# Patient Record
Sex: FEMALE | Race: WHITE | NOT HISPANIC OR LATINO | Employment: FULL TIME | URBAN - METROPOLITAN AREA
[De-identification: names, ages, dates, MRNs, and addresses within clinical notes are randomized per-mention and may not be internally consistent; named-entity substitution may affect disease eponyms.]

---

## 2017-01-09 ENCOUNTER — TRANSFERRED RECORDS (OUTPATIENT)
Dept: HEALTH INFORMATION MANAGEMENT | Facility: CLINIC | Age: 47
End: 2017-01-09

## 2017-04-13 ENCOUNTER — TELEPHONE (OUTPATIENT)
Dept: PEDIATRICS | Facility: CLINIC | Age: 47
End: 2017-04-13

## 2017-04-13 PROBLEM — Z53.8 PAP SMEAR OF CERVIX NOT NEEDED: Status: ACTIVE | Noted: 2017-04-13

## 2017-04-13 PROBLEM — Z90.710 H/O HYSTERECTOMY FOR BENIGN DISEASE: Status: ACTIVE | Noted: 2017-04-13

## 2017-04-13 NOTE — TELEPHONE ENCOUNTER
Summary:    Patient is due/failing the following:   PAP    Action needed:   Patient needs office visit for PAP.    Type of outreach:    None, routed to provider for review.    Questions for provider review:    Per surgical history patient had hysterectomy however unclear if patient is due for routine PAPs. If patient no longer needs PAPs please put surgical code HYSTERECTOMY, PAP NO LONGER INDICATED [[id number]] to exclude patient from the measure.                                                                                                                                   Savita Ferguson       Chart routed to Provider .    Panel Management Review      Patient has the following on her problem list: None      Composite cancer screening  Chart review shows that this patient is due/due soon for the following Pap Smear

## 2017-04-28 ENCOUNTER — TRANSFERRED RECORDS (OUTPATIENT)
Dept: HEALTH INFORMATION MANAGEMENT | Facility: CLINIC | Age: 47
End: 2017-04-28

## 2019-05-21 ENCOUNTER — OFFICE VISIT (OUTPATIENT)
Dept: DERMATOLOGY | Facility: CLINIC | Age: 49
End: 2019-05-21
Payer: COMMERCIAL

## 2019-05-21 DIAGNOSIS — L81.4 SOLAR LENTIGO: Primary | ICD-10-CM

## 2019-05-21 DIAGNOSIS — D18.01 CHERRY ANGIOMA: ICD-10-CM

## 2019-05-21 DIAGNOSIS — L57.8 SUN-DAMAGED SKIN: ICD-10-CM

## 2019-05-21 DIAGNOSIS — L73.8 SENILE SEBACEOUS GLAND HYPERPLASIA: ICD-10-CM

## 2019-05-21 DIAGNOSIS — D22.9 MULTIPLE BENIGN NEVI: ICD-10-CM

## 2019-05-21 DIAGNOSIS — L91.8 SKIN TAG: ICD-10-CM

## 2019-05-21 DIAGNOSIS — L82.1 SEBORRHEIC KERATOSIS: ICD-10-CM

## 2019-05-21 PROCEDURE — 99203 OFFICE O/P NEW LOW 30 MIN: CPT | Performed by: DERMATOLOGY

## 2019-05-21 ASSESSMENT — PAIN SCALES - GENERAL: PAINLEVEL: NO PAIN (0)

## 2019-05-21 NOTE — PROGRESS NOTES
[facility] Dermatology Note      Dermatology Problem List:  1. None     Encounter Date: May 21, 2019    CC:  Chief Complaint   Patient presents with     Skin Check         History of Present Illness:  Ms. Cuca Tyler is a 49 year old female who presents in self referral for a skin check. Today, she has a spot of concern on her left cheek. It came in the last year. It is elevated. Denies any tender, nonhealing, bleeding skin lesions. She had a mole/skin tag removed on her abdomen in the past because it rubbed on her pant line. No personal history of skin cancer. History of SCC in grandfather and mother. Patient uses cream based CeraVe moisturizer nightly. No other concerns addressed today.        Past Medical History:   Patient Active Problem List   Diagnosis     Family history of thyroid cancer     Family history of thyroid disease     CARDIOVASCULAR SCREENING; LDL GOAL LESS THAN 160     Thyroid nodule     Status post biopsy of thyroid gland: Atypia of undetermined significance     Irritable bowel syndrome with diarrhea     DDD (degenerative disc disease), lumbar     Spondylolisthesis, lumbar region     Pap smear of cervix not needed     H/O hysterectomy for benign disease     Past Medical History:   Diagnosis Date     Cancer (H)     Thyroid, my aunt & daughter     DDD (degenerative disc disease), lumbar 10/31/2016     Diabetes (H)     Mom     H/O hysterectomy for benign disease      Pap smear of cervix not needed      PONV (postoperative nausea and vomiting)      Thyroid nodule 5/26/2015     Past Surgical History:   Procedure Laterality Date     BIOPSY  5/15 & 2/16    Both on thyroid     FUSION LUMBAR ANTERIOR ONE LEVEL N/A 11/9/2016    Procedure: FUSION LUMBAR ANTERIOR ONE LEVEL;  Surgeon: Kit Barrera MD;  Location: [facility] OR     GYN SURGERY  8/2008    Hysterectomy     HEAD & NECK SURGERY  3/11/16    Partial thyroid lobectomy, left lobe     HYSTERECTOMY  8/2008    heavy  menses, fibroids     HYSTERECTOMY, PAP NO LONGER INDICATED  2008     OPTICAL TRACKING SYSTEM FUSION SPINE POSTERIOR LUMBAR PERCUTANEOUS ONE LEVEL N/A 11/9/2016    Procedure: OPTICAL TRACKING SYSTEM FUSION SPINE POSTERIOR LUMBAR PERCUTANEOUS ONE LEVEL;  Surgeon: Kit Barrera MD;  Location: [facility] OR       Social History:  Patient reports that she quit smoking about 22 years ago. Her smoking use included cigarettes. She has a 10.00 pack-year smoking history. She has never used smokeless tobacco. She reports that she drinks alcohol. She reports that she does not use drugs. Works as a .     Family History:  Hx of SCC in grandfather and mother.      Medications:  Current Outpatient Medications   Medication Sig Dispense Refill     amitriptyline (ELAVIL) 10 MG tablet Take 10 mg by mouth At Bedtime  1     cyclobenzaprine (FLEXERIL) 10 MG tablet Take 1 tablet (10 mg) by mouth 3 times daily as needed for muscle spasms (Patient not taking: Reported on 5/21/2019) 42 tablet 0     Fexofenadine HCl (MUCINEX ALLERGY PO)        fluticasone (FLONASE) 50 MCG/ACT nasal spray Spray 1-2 sprays into both nostrils daily (Patient not taking: Reported on 5/21/2019) 1 Bottle 0     oxyCODONE (OXYCONTIN) 10 MG 12 hr tablet Take 1 tablet (10 mg) by mouth every evening (Patient not taking: Reported on 5/21/2019) 5 tablet 0     oxyCODONE (ROXICODONE) 15 MG immediate release tablet Take 1 tablet (15 mg) by mouth every 3 hours as needed for moderate to severe pain (Patient not taking: Reported on 5/21/2019) 90 tablet 0     senna-docusate (SENOKOT-S;PERICOLACE) 8.6-50 MG per tablet Take 1-2 tablets by mouth 2 times daily (Patient not taking: Reported on 5/21/2019) 100 tablet 0       No Known Allergies    Review of Systems:  -Constitutional: Patient is otherwise feeling well, in usual state of health.   -Skin: As above in HPI. No additional skin concerns.    Physical exam:  Vitals: There were no vitals taken for this  visit.  GEN: This is a well developed, well-nourished female in no acute distress, in a pleasant mood.    SKIN: Total skin excluding the undergarment areas was performed. The exam included the head/face, neck, both arms, chest, back, abdomen, both legs, digits and/or nails and buttocks.   - Serrato Type II  - Minimal brown macules on sun exposed areas, mostly on the shoulders.  - skin tags in the bilateral underarms  - There are yellow oily papules with central umbilication located on the right forehead.  -There are dome shaped bright red papules on the trunk.   - Multiple regular brown pigmented macules and papules are identified on the face, trunk (area of pt concern).   - There are waxy stuck on tan to brown papules on the trunk.  - No other lesions of concern on areas examined.     Impression/Plan:  1. Sun damaged skin with solar lentigines    Recommend sunscreens SPF #30 or greater, protective clothing and avoidance of tanning beds.    2. Benign findings including: seborrheic keratoses, cherry angioma, skin tag    No further intervention required. Patient to report changes.     Patient reassured of the benign nature of these lesions.    3. Multiple clinically benign nevi    No further intervention required. Patient to report changes.     Patient reassured of the benign nature of these lesions.    Follow-up 2-3 years for skin exam, sooner for lesions of concern.     Staff Involved:  Scribe/Staff    Scribe Disclosure  I, Elida Parham, am serving as a scribe to document services personally performed by Dr. Kailyn Dillon MD, based on data collection and the provider's statements to me.     Provider Disclosure:   The documentation recorded by the scribe accurately reflects the services I personally performed and the decisions made by me.    Kailyn Dillon MD    Department of Dermatology  [facility]: Phone: [phone],  Fax:[phone]  [facility]: Phone: [phone], Fax: [phone]

## 2019-05-21 NOTE — NURSING NOTE
@Cuca Tyler's goals for this visit include:   Chief Complaint   Patient presents with     Skin Check       She requests these members of her care team be copied on today's visit information: NO    PCP: Tara Sanchez    Referring Provider:  No referring provider defined for this encounter.    There were no vitals taken for this visit.    Do you need any medication refills at today's visit? NO    Brittney Sawant CMA

## 2019-05-21 NOTE — LETTER
5/21/2019         RE: Cuca Tyler  [address] [place] [address]        Dear Colleague,    Thank you for referring your patient, Cuca Tyler, to the [facility]. Please see a copy of my visit note below.    [facility] Dermatology Note      Dermatology Problem List:  1. None     Encounter Date: May 21, 2019    CC:  Chief Complaint   Patient presents with     Skin Check         History of Present Illness:  Ms. Cuca Tyler is a 49 year old female who presents in self referral for a skin check. Today, she has a spot of concern on her left cheek. It came in the last year. It is elevated. Denies any tender, nonhealing, bleeding skin lesions. She had a mole/skin tag removed on her abdomen in the past because it rubbed on her pant line. No personal history of skin cancer. History of SCC in grandfather and mother. Patient uses cream based CeraVe moisturizer nightly. No other concerns addressed today.        Past Medical History:   Patient Active Problem List   Diagnosis     Family history of thyroid cancer     Family history of thyroid disease     CARDIOVASCULAR SCREENING; LDL GOAL LESS THAN 160     Thyroid nodule     Status post biopsy of thyroid gland: Atypia of undetermined significance     Irritable bowel syndrome with diarrhea     DDD (degenerative disc disease), lumbar     Spondylolisthesis, lumbar region     Pap smear of cervix not needed     H/O hysterectomy for benign disease     Past Medical History:   Diagnosis Date     Cancer (H)     Thyroid, my aunt & daughter     DDD (degenerative disc disease), lumbar 10/31/2016     Diabetes (H)     Mom     H/O hysterectomy for benign disease      Pap smear of cervix not needed      PONV (postoperative nausea and vomiting)      Thyroid nodule 5/26/2015     Past Surgical History:   Procedure Laterality Date     BIOPSY  5/15 & 2/16    Both on thyroid     FUSION LUMBAR ANTERIOR ONE LEVEL N/A 11/9/2016     Procedure: FUSION LUMBAR ANTERIOR ONE LEVEL;  Surgeon: Kit Barrera MD;  [facility]     GYN SURGERY  8/2008    Hysterectomy     HEAD & NECK SURGERY  3/11/16    Partial thyroid lobectomy, left lobe     HYSTERECTOMY  8/2008    heavy menses, fibroids     HYSTERECTOMY, PAP NO LONGER INDICATED  2008     OPTICAL TRACKING SYSTEM FUSION SPINE POSTERIOR LUMBAR PERCUTANEOUS ONE LEVEL N/A 11/9/2016    Procedure: OPTICAL TRACKING SYSTEM FUSION SPINE POSTERIOR LUMBAR PERCUTANEOUS ONE LEVEL;  Surgeon: Kit Barrera MD;  [facility]       Social History:  Patient reports that she quit smoking about 22 years ago. Her smoking use included cigarettes. She has a 10.00 pack-year smoking history. She has never used smokeless tobacco. She reports that she drinks alcohol. She reports that she does not use drugs. Works as a .     Family History:  Hx of SCC in grandfather and mother.      Medications:  Current Outpatient Medications   Medication Sig Dispense Refill     amitriptyline (ELAVIL) 10 MG tablet Take 10 mg by mouth At Bedtime  1     cyclobenzaprine (FLEXERIL) 10 MG tablet Take 1 tablet (10 mg) by mouth 3 times daily as needed for muscle spasms (Patient not taking: Reported on 5/21/2019) 42 tablet 0     Fexofenadine HCl (MUCINEX ALLERGY PO)        fluticasone (FLONASE) 50 MCG/ACT nasal spray Spray 1-2 sprays into both nostrils daily (Patient not taking: Reported on 5/21/2019) 1 Bottle 0     oxyCODONE (OXYCONTIN) 10 MG 12 hr tablet Take 1 tablet (10 mg) by mouth every evening (Patient not taking: Reported on 5/21/2019) 5 tablet 0     oxyCODONE (ROXICODONE) 15 MG immediate release tablet Take 1 tablet (15 mg) by mouth every 3 hours as needed for moderate to severe pain (Patient not taking: Reported on 5/21/2019) 90 tablet 0     senna-docusate (SENOKOT-S;PERICOLACE) 8.6-50 MG per tablet Take 1-2 tablets by mouth 2 times daily (Patient not taking: Reported on 5/21/2019) 100 tablet  0       No Known Allergies    Review of Systems:  -Constitutional: Patient is otherwise feeling well, in usual state of health.   -Skin: As above in HPI. No additional skin concerns.    Physical exam:  Vitals: There were no vitals taken for this visit.  GEN: This is a well developed, well-nourished female in no acute distress, in a pleasant mood.    SKIN: Total skin excluding the undergarment areas was performed. The exam included the head/face, neck, both arms, chest, back, abdomen, both legs, digits and/or nails and buttocks.   - Serrato Type II  - Minimal brown macules on sun exposed areas, mostly on the shoulders.  - skin tags in the bilateral underarms  - There are yellow oily papules with central umbilication located on the right forehead.  -There are dome shaped bright red papules on the trunk.   - Multiple regular brown pigmented macules and papules are identified on the face, trunk (area of pt concern).   - There are waxy stuck on tan to brown papules on the trunk.  - No other lesions of concern on areas examined.     Impression/Plan:  1. Sun damaged skin with solar lentigines    Recommend sunscreens SPF #30 or greater, protective clothing and avoidance of tanning beds.    2. Benign findings including: seborrheic keratoses, cherry angioma, skin tag    No further intervention required. Patient to report changes.     Patient reassured of the benign nature of these lesions.    3. Multiple clinically benign nevi    No further intervention required. Patient to report changes.     Patient reassured of the benign nature of these lesions.    Follow-up 2-3 years for skin exam, sooner for lesions of concern.     Staff Involved:  Scribe/Staff    Scribe Disclosure  I, Elida Parham, am serving as a scribe to document services personally performed by Dr. Kailyn Dillon MD, based on data collection and the provider's statements to me.     Provider Disclosure:   The documentation recorded by the scribe accurately  reflects the services I personally performed and the decisions made by me.    Kailyn Dillon MD    Department of Dermatology  [facility]: Phone: [phone], Fax:[phone]  [facility]: Phone: [phone], Fax: [phone]              Again, thank you for allowing me to participate in the care of your patient.        Sincerely,        Kailyn Dillon MD

## 2019-07-05 ENCOUNTER — TELEPHONE (OUTPATIENT)
Dept: ENDOCRINOLOGY | Facility: CLINIC | Age: 49
End: 2019-07-05

## 2019-07-05 NOTE — TELEPHONE ENCOUNTER
PREVISIT INFORMATION                                                    Cuca Tyler scheduled for future visit at [facility] specialty clinics.    Patient is scheduled to see Dr. Tamica Da Silva on 7/15/19  Reason for visit: thyroid cancer  Referring provider - None  Has patient seen previous specialist? No  Medical Records:  Available in chart.  Patient was previously seen at a [facility] or [facility] facility.    REVIEW                                                      New patient packet mailed to patient: N/A  Medication reconciliation complete: Yes      No current outpatient medications on file.       Allergies: Patient has no known allergies.    PLAN/FOLLOW-UP NEEDED                                                      Previsit review complete.  Patient will see provider at future scheduled appointment.     Patient Reminders Given:  Please, make sure you bring an updated list of your medications.   If you are having a procedure, please, present 15 minutes early.  If you need to cancel or reschedule,please call [phone].    Nkechi Smith CMA  Adult Endocrinology  [facility]

## 2019-07-15 ENCOUNTER — OFFICE VISIT (OUTPATIENT)
Dept: ENDOCRINOLOGY | Facility: CLINIC | Age: 49
End: 2019-07-15
Payer: COMMERCIAL

## 2019-07-15 VITALS
WEIGHT: 171.3 LBS | SYSTOLIC BLOOD PRESSURE: 123 MMHG | OXYGEN SATURATION: 99 % | HEART RATE: 71 BPM | BODY MASS INDEX: 29.4 KG/M2 | DIASTOLIC BLOOD PRESSURE: 81 MMHG

## 2019-07-15 DIAGNOSIS — C73 PAPILLARY THYROID CARCINOMA (H): Primary | ICD-10-CM

## 2019-07-15 LAB
T4 FREE SERPL-MCNC: 0.79 NG/DL (ref 0.76–1.46)
TSH SERPL DL<=0.005 MIU/L-ACNC: 0.9 MU/L (ref 0.4–4)

## 2019-07-15 PROCEDURE — 99204 OFFICE O/P NEW MOD 45 MIN: CPT | Performed by: INTERNAL MEDICINE

## 2019-07-15 PROCEDURE — 36415 COLL VENOUS BLD VENIPUNCTURE: CPT | Performed by: INTERNAL MEDICINE

## 2019-07-15 PROCEDURE — 84443 ASSAY THYROID STIM HORMONE: CPT | Performed by: INTERNAL MEDICINE

## 2019-07-15 PROCEDURE — 84439 ASSAY OF FREE THYROXINE: CPT | Performed by: INTERNAL MEDICINE

## 2019-07-15 NOTE — LETTER
7/15/2019         RE: Cuca Tyler  [address] [place] [address]        Dear Colleague,    Thank you for referring your patient, Cuca Tyler, to the [facility]. Please see a copy of my visit note below.         Endocrinology Note         Cuca is a 49 year old female presents today for left papillary thyroid cancer s/p left lobectomy    HPI  Cuca is a 49 year old female presents today for left papillary thyroid cancer s/p left lobectomy    Cuca is here to follow up  left papillary thyroid cancer s/p left lobectomy since 2016. She was found to have  left papillary thyroid cancer based on ultrasound in 2015 that showed 5.5 cm left sided heterogeneous nodule and 0.9 cm hypoechoic nodule at the isthmus along the right side. FNA x2 were AUS. 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 She has not had follow up lab or ultrasound due to lack of insurance after the surgery.    Over the past few years, she has been feeling well. She denied difficulty swallowing, breathing or lump in the neck. She occasionally felt mild painful small lymphadenopathy that was not persist. She has had hot flash. She has hx of hysterectomy with ovary preserved. She has IBS with some altered bowel movement. Energy is good. Weight is relatively stable.     Past Medical History  Past Medical History:   Diagnosis Date     Cancer (H)     Thyroid, my aunt & daughter     DDD (degenerative disc disease), lumbar 10/31/2016     Diabetes (H)     Mom     H/O hysterectomy for benign disease      Pap smear of cervix not needed      PONV (postoperative nausea and vomiting)      Thyroid nodule 5/26/2015       Allergies  No Known Allergies  Medications  No current outpatient medications on file.     Family History  family history includes Diabetes in her mother; Other Cancer  in her cousin, daughter, daughter, maternal aunt, and other family members; Thyroid Disease in her cousin, daughter, daughter, daughter, maternal aunt, maternal grandmother, and mother.  Social History  Social History     Tobacco Use     Smoking status: Former Smoker     Packs/day: 1.00     Years: 10.00     Pack years: 10.00     Types: Cigarettes     Last attempt to quit: 5/15/1997     Years since quittin.1     Smokeless tobacco: Never Used   Substance Use Topics     Alcohol use: Yes     Alcohol/week: 0.0 oz     Comment: A lillte / occasional      Drug use: No       ROS  Constitutional: no weight change, good energy  Eyes: no vision change, diplopia or red eyes   Neck: no difficulty swallowing, no choking, no neck pain, no neck swelling  Cardiovascular: no chest pain, palpitations  Respiratory: no dyspnea, cough, shortness of breath or wheezing   GI: no nausea, vomiting, diarrhea or constipation, no abdominal pain   : no change in urine, no dysuria or hematuria  Musculoskeletal: no joint or muscle pain or swelling   Integumentary: no concerning lesions or moles   Neuro: no loss of strength or sensation, no numbness or tingling, no tremor, no dizziness, no headache   Endo: no polyuria or polydipsia, no temperature intolerance   Heme/Lymph: no concerning bumps, no bleeding problems   Allergy: no environmental allergies   Psych: no depression or anxiety, no sleep problems.    Physical Exam  /81 (BP Location: Left arm, Patient Position: Sitting, Cuff Size: Adult Regular)   Pulse 71   Wt 77.7 kg (171 lb 4.8 oz)   SpO2 99%   BMI 29.40 kg/m     Body mass index is 29.4 kg/m .  Constitutional: no distress, comfortable, pleasant   Eyes: anicteric, normal extra-ocular movements, no lid lag or retraction  Neck: no thyromegaly, no discrete nodule  Cardiovascular: regular rate and rhythm, normal S1 and S2, no murmurs  Respiratory: clear to auscultation, no wheezes or crackles, normal breath sounds    Gastrointestinal:  nontender, no hepatosplenomegaly, no masses   Musculoskeletal: no edema   Skin: no concerning lesions, no jaundice   Neurological: cranial nerves intact, normal strength and sensation, reflexes at patella, normal gait, no tremor on outstretched hands bilaterally  Psychological: appropriate mood   Lymphatic: no cervical  lymphadenopathy.      RESULTS  I have personally reviewed labs and images. I also reviewed labs with patient and discussed the result and plan of care.      US thyroid 5/22/2015  FINDINGS: The right lobe the thyroid measures 4.6 x 1.6 x 1.7 cm, the left lobe of the thyroid measures 6.0 x 2.4 x 3.2 cm, and the isthmus measures 0.7 cm.       Within the isthmus along the right side is a 0.6 x 0.5 x 0.9 cm hypoechoic nodule with internal vascularity. .  In the left lobe of the thyroid there is a 3.0 x 1.9 x 5.5 cm heterogeneous nodule with internal vascularity. .     IMPRESSION:  2 thyroid nodules which appear indeterminant. Thyroid ultrasound cannot differentiate malignancy from malignancy.     FNA 5/29/2015  CYTOLOGIC INTERPRETATION:     Thyroid, left nodule, ultrasound guided fine needle aspiration:   -Atypia of undetermined significance   Specimen Adequacy: Satisfactory for evaluation.     FNA 2/2/2016  CYTOLOGIC INTERPRETATION:     Thyroid, left nodule, ultrasound guided fine needle aspiration:   - Atypia of Undetermined Significance      Surgical pathology 3/11/2016  .   ASSESSMENT:    Cuca is a 49 year old female presents today for left papillary thyroid cancer s/p left lobectomy    1) left papillary thyroid cancer s/p left lobectomy: found in 2015 with large left thyroid nodule with 2 AUS in FNAs. Underwent left lobectomy in 2016. Pathology revealed micropapillary thyroid cancer (0.5 cm) without lymph node or extrathyroidal extension. Has not had followed since due to lack of insurance. No symptoms of neck compression, hypo or hyperthyroidism.   Will check lab and ultrasound  thyroid/lateral neck today.    PLAN:   - lab for TSH, FT4  - schedule ultrasound thyroid and neck    Tamica Da Silva MD  Division of Diabetes and Endocrinology  Department of Medicine  [phone]

## 2019-07-15 NOTE — NURSING NOTE
Cuca Tyler's goals for this visit include:   Chief Complaint   Patient presents with     Consult     Thyroid Disease     She requests these members of her care team be copied on today's visit information: Yes    PCP: Tara aSnchez    Referring Provider:  Tara Sanchez, APRN CNP  [id number]  [place] [address]    /81 (BP Location: Left arm, Patient Position: Sitting, Cuff Size: Adult Regular)   Pulse 71   Wt 77.7 kg (171 lb 4.8 oz)   SpO2 99%   BMI 29.40 kg/m      Do you need any medication refills at today's visit? No

## 2019-07-15 NOTE — PROGRESS NOTES
Endocrinology Note         Cuca is a 49 year old female presents today for left papillary thyroid cancer s/p left lobectomy    HPI  Cuca is a 49 year old female presents today for left papillary thyroid cancer s/p left lobectomy    Cuca is here to follow up  left papillary thyroid cancer s/p left lobectomy since 2016. She was found to have  left papillary thyroid cancer based on ultrasound in 2015 that showed 5.5 cm left sided heterogeneous nodule and 0.9 cm hypoechoic nodule at the isthmus along the right side. FNA x2 were AUS. 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 She has not had follow up lab or ultrasound due to lack of insurance after the surgery.    Over the past few years, she has been feeling well. She denied difficulty swallowing, breathing or lump in the neck. She occasionally felt mild painful small lymphadenopathy that was not persist. She has had hot flash. She has hx of hysterectomy with ovary preserved. She has IBS with some altered bowel movement. Energy is good. Weight is relatively stable.     Past Medical History  Past Medical History:   Diagnosis Date     Cancer (H)     Thyroid, my aunt & daughter     DDD (degenerative disc disease), lumbar 10/31/2016     Diabetes (H)     Mom     H/O hysterectomy for benign disease      Pap smear of cervix not needed      PONV (postoperative nausea and vomiting)      Thyroid nodule 5/26/2015       Allergies  No Known Allergies  Medications  No current outpatient medications on file.     Family History  family history includes Diabetes in her mother; Other Cancer in her cousin, daughter, daughter, maternal aunt, and other family members; Thyroid Disease in her cousin, daughter, daughter, daughter, maternal aunt, maternal grandmother, and mother.  Social History  Social History     Tobacco Use     Smoking  status: Former Smoker     Packs/day: 1.00     Years: 10.00     Pack years: 10.00     Types: Cigarettes     Last attempt to quit: 5/15/1997     Years since quittin.1     Smokeless tobacco: Never Used   Substance Use Topics     Alcohol use: Yes     Alcohol/week: 0.0 oz     Comment: A lillte / occasional      Drug use: No       ROS  Constitutional: no weight change, good energy  Eyes: no vision change, diplopia or red eyes   Neck: no difficulty swallowing, no choking, no neck pain, no neck swelling  Cardiovascular: no chest pain, palpitations  Respiratory: no dyspnea, cough, shortness of breath or wheezing   GI: no nausea, vomiting, diarrhea or constipation, no abdominal pain   : no change in urine, no dysuria or hematuria  Musculoskeletal: no joint or muscle pain or swelling   Integumentary: no concerning lesions or moles   Neuro: no loss of strength or sensation, no numbness or tingling, no tremor, no dizziness, no headache   Endo: no polyuria or polydipsia, no temperature intolerance   Heme/Lymph: no concerning bumps, no bleeding problems   Allergy: no environmental allergies   Psych: no depression or anxiety, no sleep problems.    Physical Exam  /81 (BP Location: Left arm, Patient Position: Sitting, Cuff Size: Adult Regular)   Pulse 71   Wt 77.7 kg (171 lb 4.8 oz)   SpO2 99%   BMI 29.40 kg/m    Body mass index is 29.4 kg/m .  Constitutional: no distress, comfortable, pleasant   Eyes: anicteric, normal extra-ocular movements, no lid lag or retraction  Neck: no thyromegaly, no discrete nodule  Cardiovascular: regular rate and rhythm, normal S1 and S2, no murmurs  Respiratory: clear to auscultation, no wheezes or crackles, normal breath sounds   Gastrointestinal:  nontender, no hepatosplenomegaly, no masses   Musculoskeletal: no edema   Skin: no concerning lesions, no jaundice   Neurological: cranial nerves intact, normal strength and sensation, reflexes at patella, normal gait, no tremor on  outstretched hands bilaterally  Psychological: appropriate mood   Lymphatic: no cervical  lymphadenopathy.      RESULTS  I have personally reviewed labs and images. I also reviewed labs with patient and discussed the result and plan of care.      US thyroid 5/22/2015  FINDINGS: The right lobe the thyroid measures 4.6 x 1.6 x 1.7 cm, the left lobe of the thyroid measures 6.0 x 2.4 x 3.2 cm, and the isthmus measures 0.7 cm.       Within the isthmus along the right side is a 0.6 x 0.5 x 0.9 cm hypoechoic nodule with internal vascularity. .  In the left lobe of the thyroid there is a 3.0 x 1.9 x 5.5 cm heterogeneous nodule with internal vascularity. .     IMPRESSION:  2 thyroid nodules which appear indeterminant. Thyroid ultrasound cannot differentiate malignancy from malignancy.     FNA 5/29/2015  CYTOLOGIC INTERPRETATION:     Thyroid, left nodule, ultrasound guided fine needle aspiration:   -Atypia of undetermined significance   Specimen Adequacy: Satisfactory for evaluation.     FNA 2/2/2016  CYTOLOGIC INTERPRETATION:     Thyroid, left nodule, ultrasound guided fine needle aspiration:   - Atypia of Undetermined Significance      Surgical pathology 3/11/2016  .   ASSESSMENT:    Cuca is a 49 year old female presents today for left papillary thyroid cancer s/p left lobectomy    1) left papillary thyroid cancer s/p left lobectomy: found in 2015 with large left thyroid nodule with 2 AUS in FNAs. Underwent left lobectomy in 2016. Pathology revealed micropapillary thyroid cancer (0.5 cm) without lymph node or extrathyroidal extension. Has not had followed since due to lack of insurance. No symptoms of neck compression, hypo or hyperthyroidism.   Will check lab and ultrasound thyroid/lateral neck today.    PLAN:   - lab for TSH, FT4  - schedule ultrasound thyroid and neck    Tamica Da Silva MD  Division of Diabetes and Endocrinology  Department of Medicine  [phone]

## 2019-07-29 ENCOUNTER — ANCILLARY PROCEDURE (OUTPATIENT)
Dept: ULTRASOUND IMAGING | Facility: CLINIC | Age: 49
End: 2019-07-29
Attending: INTERNAL MEDICINE
Payer: COMMERCIAL

## 2019-07-29 DIAGNOSIS — C73 PAPILLARY THYROID CARCINOMA (H): ICD-10-CM

## 2019-07-29 PROCEDURE — 76536 US EXAM OF HEAD AND NECK: CPT | Performed by: RADIOLOGY

## 2019-08-07 ENCOUNTER — TELEPHONE (OUTPATIENT)
Dept: ENDOCRINOLOGY | Facility: CLINIC | Age: 49
End: 2019-08-07

## 2019-08-07 DIAGNOSIS — C73 PAPILLARY THYROID CARCINOMA (H): Primary | ICD-10-CM

## 2019-08-07 NOTE — TELEPHONE ENCOUNTER
Patient informed and transferred to schedule ultrasound. The patient did not have any questions and agreed with the plan.    Nkechi [facility]  Adult Endocrinology  [facility]     29-Oct-2018 11:52

## 2019-08-07 NOTE — TELEPHONE ENCOUNTER
----- Message from Tamica Da Silva MD sent at 8/7/2019 12:48 PM CDT -----  I have sent message via [employer]. I would like her to get repeated ultrasound neck in 6-8 months from now. Could you please help?    Thanks,  Tamica

## 2019-09-28 ENCOUNTER — HEALTH MAINTENANCE LETTER (OUTPATIENT)
Age: 49
End: 2019-09-28

## 2020-02-06 ENCOUNTER — ANCILLARY PROCEDURE (OUTPATIENT)
Dept: ULTRASOUND IMAGING | Facility: CLINIC | Age: 50
End: 2020-02-06
Attending: INTERNAL MEDICINE
Payer: COMMERCIAL

## 2020-02-06 DIAGNOSIS — C73 PAPILLARY THYROID CARCINOMA (H): ICD-10-CM

## 2020-02-06 PROCEDURE — 76536 US EXAM OF HEAD AND NECK: CPT

## 2020-02-07 ENCOUNTER — TELEPHONE (OUTPATIENT)
Dept: ENDOCRINOLOGY | Facility: CLINIC | Age: 50
End: 2020-02-07

## 2020-02-07 DIAGNOSIS — C73 PAPILLARY THYROID CARCINOMA (H): Primary | ICD-10-CM

## 2020-02-07 DIAGNOSIS — R59.9 ENLARGED LYMPH NODES: ICD-10-CM

## 2020-02-07 NOTE — TELEPHONE ENCOUNTER
Reviewed ultrasound neck 2/6/2020    Findings:   RIGHT:      Level 2: Lymph node #1 measures 23 x 12 x 8 mm   Level 3: Lymph node #1 measures 22 x 10 x 4 mm, Lymph node #2 measures 12 x 8 x 4 mm  Level 4: No lymph node is seen  Level 5: No lymph node is seen   Level 6: Normal thyroid tissue is seen.   Level 7: No lymph node is seen     LEFT:     Level 2: Lymph node #1 measures  14 x 9 x 8 mm, Lymph node #2 measures 15 x 9 x 5 mm  Level 3: Lymph node #1 measures  20 x 9 x 5 mm (previously 19 x 1 x 5 mm and similar in appearance), Lymph node #2 measures 8 x 6 x 3 mm. Level 3 #1 lymph node demonstrates a somewhat diffusely thickened cortex with reduced visibility of the fatty hilum, similar in  appearance to the prior and to lymph node #1 in level 3 on the right, most compatible with a reactive node.     Level 4: Lymph node #1 measures 16 x 10 x 8 mm, previously 15 x 8 x 6 mm. This lymph node has a somewhat unusual appearance for level 4 with reduced visibility of the fatty hilum and thickened cortex. Possible peripheral blood flow.     Level 5: No lymph node is seen  Level 6: No soft tissue mass or residual tissue is seen.  Level 7: No lymph node is seen     Aside from lymph node #4 on the left, above lymph nodes demonstrate normal shape, tahira and blood flow and demonstrate no evidence for  intranodal necrosis, reticulation, calcification or matting.                                                                   Impression: Mild interval enlargement of level 4 lymph node on the left with thickened cortex and poorly visualized fatty hilum. FNA  should be considered.      Plan:   Given enlarged left level #4 lymph node with poor visulization of fatty hilum, I would like her to persue FNA.     Patient agrees and verbalized understanding.    Tamica Da Silva MD  Division of Diabetes and Endocrinology  Department of Medicine

## 2020-02-21 ENCOUNTER — ANCILLARY PROCEDURE (OUTPATIENT)
Dept: ULTRASOUND IMAGING | Facility: CLINIC | Age: 50
End: 2020-02-21
Attending: INTERNAL MEDICINE
Payer: COMMERCIAL

## 2020-02-21 DIAGNOSIS — R59.9 ENLARGED LYMPH NODES: ICD-10-CM

## 2020-02-21 DIAGNOSIS — C73 PAPILLARY THYROID CARCINOMA (H): ICD-10-CM

## 2020-02-21 RX ORDER — LIDOCAINE HYDROCHLORIDE 10 MG/ML
5 INJECTION, SOLUTION INFILTRATION; PERINEURAL ONCE
Status: COMPLETED | OUTPATIENT
Start: 2020-02-21 | End: 2020-02-21

## 2020-02-21 RX ADMIN — LIDOCAINE HYDROCHLORIDE 1 ML: 10 INJECTION, SOLUTION INFILTRATION; PERINEURAL at 10:48

## 2020-02-24 LAB — COPATH REPORT: NORMAL

## 2020-02-25 LAB — COPATH REPORT: NORMAL

## 2020-02-27 LAB — LAB SCANNED RESULT: NORMAL

## 2021-01-10 ENCOUNTER — HEALTH MAINTENANCE LETTER (OUTPATIENT)
Age: 51
End: 2021-01-10

## 2021-03-13 ENCOUNTER — HEALTH MAINTENANCE LETTER (OUTPATIENT)
Age: 51
End: 2021-03-13

## 2021-10-23 ENCOUNTER — HEALTH MAINTENANCE LETTER (OUTPATIENT)
Age: 51
End: 2021-10-23

## 2022-02-12 ENCOUNTER — HEALTH MAINTENANCE LETTER (OUTPATIENT)
Age: 52
End: 2022-02-12

## 2022-04-09 ENCOUNTER — HEALTH MAINTENANCE LETTER (OUTPATIENT)
Age: 52
End: 2022-04-09

## 2022-10-09 ENCOUNTER — HEALTH MAINTENANCE LETTER (OUTPATIENT)
Age: 52
End: 2022-10-09

## 2022-12-11 ASSESSMENT — ENCOUNTER SYMPTOMS
HEMATURIA: 0
PARESTHESIAS: 0
NERVOUS/ANXIOUS: 0
COUGH: 0
CHILLS: 0
DYSURIA: 0
PALPITATIONS: 0
FREQUENCY: 0
HEARTBURN: 0
SORE THROAT: 0
NAUSEA: 0
WEAKNESS: 0
JOINT SWELLING: 0
HEADACHES: 0
ARTHRALGIAS: 0
EYE PAIN: 0
ABDOMINAL PAIN: 0
DIZZINESS: 0
CONSTIPATION: 0
FEVER: 0
BREAST MASS: 0
HEMATOCHEZIA: 0
MYALGIAS: 0
SHORTNESS OF BREATH: 0
DIARRHEA: 0

## 2022-12-12 ENCOUNTER — TELEPHONE (OUTPATIENT)
Dept: FAMILY MEDICINE | Facility: CLINIC | Age: 52
End: 2022-12-12

## 2022-12-12 ENCOUNTER — OFFICE VISIT (OUTPATIENT)
Dept: FAMILY MEDICINE | Facility: CLINIC | Age: 52
End: 2022-12-12
Payer: COMMERCIAL

## 2022-12-12 VITALS
OXYGEN SATURATION: 98 % | HEART RATE: 73 BPM | HEIGHT: 64 IN | BODY MASS INDEX: 32.83 KG/M2 | TEMPERATURE: 97.7 F | WEIGHT: 192.3 LBS | DIASTOLIC BLOOD PRESSURE: 82 MMHG | SYSTOLIC BLOOD PRESSURE: 122 MMHG

## 2022-12-12 DIAGNOSIS — Z13.220 SCREENING FOR HYPERLIPIDEMIA: ICD-10-CM

## 2022-12-12 DIAGNOSIS — K58.0 IRRITABLE BOWEL SYNDROME WITH DIARRHEA: ICD-10-CM

## 2022-12-12 DIAGNOSIS — C73 PAPILLARY THYROID CARCINOMA (H): ICD-10-CM

## 2022-12-12 DIAGNOSIS — Z12.31 VISIT FOR SCREENING MAMMOGRAM: ICD-10-CM

## 2022-12-12 DIAGNOSIS — Z12.11 SCREEN FOR COLON CANCER: ICD-10-CM

## 2022-12-12 DIAGNOSIS — Z00.00 ROUTINE GENERAL MEDICAL EXAMINATION AT A HEALTH CARE FACILITY: Primary | ICD-10-CM

## 2022-12-12 LAB
ANION GAP SERPL CALCULATED.3IONS-SCNC: 3 MMOL/L (ref 3–14)
BASOPHILS # BLD AUTO: 0.1 10E3/UL (ref 0–0.2)
BASOPHILS NFR BLD AUTO: 1 %
BUN SERPL-MCNC: 9 MG/DL (ref 7–30)
CALCIUM SERPL-MCNC: 8.6 MG/DL (ref 8.5–10.1)
CHLORIDE BLD-SCNC: 110 MMOL/L (ref 94–109)
CHOLEST SERPL-MCNC: 214 MG/DL
CO2 SERPL-SCNC: 28 MMOL/L (ref 20–32)
CREAT SERPL-MCNC: 0.56 MG/DL (ref 0.52–1.04)
EOSINOPHIL # BLD AUTO: 0.2 10E3/UL (ref 0–0.7)
EOSINOPHIL NFR BLD AUTO: 3 %
ERYTHROCYTE [DISTWIDTH] IN BLOOD BY AUTOMATED COUNT: 12.1 % (ref 10–15)
FASTING STATUS PATIENT QL REPORTED: YES
GFR SERPL CREATININE-BSD FRML MDRD: >90 ML/MIN/1.73M2
GLUCOSE BLD-MCNC: 101 MG/DL (ref 70–99)
HCT VFR BLD AUTO: 40.4 % (ref 35–47)
HDLC SERPL-MCNC: 103 MG/DL
HGB BLD-MCNC: 13.4 G/DL (ref 11.7–15.7)
IMM GRANULOCYTES # BLD: 0 10E3/UL
IMM GRANULOCYTES NFR BLD: 0 %
LDLC SERPL CALC-MCNC: 97 MG/DL
LYMPHOCYTES # BLD AUTO: 2 10E3/UL (ref 0.8–5.3)
LYMPHOCYTES NFR BLD AUTO: 37 %
MCH RBC QN AUTO: 30.3 PG (ref 26.5–33)
MCHC RBC AUTO-ENTMCNC: 33.2 G/DL (ref 31.5–36.5)
MCV RBC AUTO: 91 FL (ref 78–100)
MONOCYTES # BLD AUTO: 0.4 10E3/UL (ref 0–1.3)
MONOCYTES NFR BLD AUTO: 7 %
NEUTROPHILS # BLD AUTO: 2.8 10E3/UL (ref 1.6–8.3)
NEUTROPHILS NFR BLD AUTO: 52 %
NONHDLC SERPL-MCNC: 111 MG/DL
PLATELET # BLD AUTO: 280 10E3/UL (ref 150–450)
POTASSIUM BLD-SCNC: 4.1 MMOL/L (ref 3.4–5.3)
RBC # BLD AUTO: 4.42 10E6/UL (ref 3.8–5.2)
SODIUM SERPL-SCNC: 141 MMOL/L (ref 133–144)
TRIGL SERPL-MCNC: 71 MG/DL
TSH SERPL DL<=0.005 MIU/L-ACNC: 0.61 MU/L (ref 0.4–4)
WBC # BLD AUTO: 5.3 10E3/UL (ref 4–11)

## 2022-12-12 PROCEDURE — 99214 OFFICE O/P EST MOD 30 MIN: CPT | Mod: 25 | Performed by: PHYSICIAN ASSISTANT

## 2022-12-12 PROCEDURE — 84443 ASSAY THYROID STIM HORMONE: CPT | Performed by: PHYSICIAN ASSISTANT

## 2022-12-12 PROCEDURE — 90750 HZV VACC RECOMBINANT IM: CPT | Performed by: PHYSICIAN ASSISTANT

## 2022-12-12 PROCEDURE — 82784 ASSAY IGA/IGD/IGG/IGM EACH: CPT | Performed by: PHYSICIAN ASSISTANT

## 2022-12-12 PROCEDURE — 86364 TISS TRNSGLTMNASE EA IG CLAS: CPT | Performed by: PHYSICIAN ASSISTANT

## 2022-12-12 PROCEDURE — 90471 IMMUNIZATION ADMIN: CPT | Performed by: PHYSICIAN ASSISTANT

## 2022-12-12 PROCEDURE — 36415 COLL VENOUS BLD VENIPUNCTURE: CPT | Performed by: PHYSICIAN ASSISTANT

## 2022-12-12 PROCEDURE — 80061 LIPID PANEL: CPT | Performed by: PHYSICIAN ASSISTANT

## 2022-12-12 PROCEDURE — 80048 BASIC METABOLIC PNL TOTAL CA: CPT | Performed by: PHYSICIAN ASSISTANT

## 2022-12-12 PROCEDURE — 85025 COMPLETE CBC W/AUTO DIFF WBC: CPT | Performed by: PHYSICIAN ASSISTANT

## 2022-12-12 PROCEDURE — 99386 PREV VISIT NEW AGE 40-64: CPT | Mod: 25 | Performed by: PHYSICIAN ASSISTANT

## 2022-12-12 RX ORDER — DICYCLOMINE HYDROCHLORIDE 10 MG/1
10 CAPSULE ORAL 4 TIMES DAILY PRN
Qty: 30 CAPSULE | Refills: 0 | Status: SHIPPED | OUTPATIENT
Start: 2022-12-12 | End: 2024-05-27

## 2022-12-12 ASSESSMENT — ENCOUNTER SYMPTOMS
NERVOUS/ANXIOUS: 0
NAUSEA: 0
ARTHRALGIAS: 0
DIARRHEA: 0
HEADACHES: 0
CONSTIPATION: 0
HEMATURIA: 0
HEMATOCHEZIA: 0
DYSURIA: 0
PARESTHESIAS: 0
JOINT SWELLING: 0
FEVER: 0
EYE PAIN: 0
SORE THROAT: 0
DIZZINESS: 0
SHORTNESS OF BREATH: 0
BREAST MASS: 0
COUGH: 0
CHILLS: 0
PALPITATIONS: 0
ABDOMINAL PAIN: 0
FREQUENCY: 0
MYALGIAS: 0
WEAKNESS: 0
HEARTBURN: 0

## 2022-12-12 ASSESSMENT — PAIN SCALES - GENERAL: PAINLEVEL: NO PAIN (0)

## 2022-12-12 NOTE — TELEPHONE ENCOUNTER
Pt w/history of papillary thyroid carcinoma.   Last visit w/- 07/2019. Had follow up US neck and biopsy of lymph node 02/2020. Was advised follow up with Dr. Da Silva summer 2020 (6months).  Has not had follow up.   Please assist pt in getting scheduled for follow up visit and follow up imaging.     TC- please route to endocrine RN for overdue scheduling.     CARLOS LrC

## 2022-12-12 NOTE — NURSING NOTE
Prior to immunization administration, verified patients identity using patient s name and date of birth. Please see Immunization Activity for additional information.     Screening Questionnaire for Adult Immunization    Are you sick today?   No   Do you have allergies to medications, food, a vaccine component or latex?   No   Have you ever had a serious reaction after receiving a vaccination?   No   Do you have a long-term health problem with heart, lung, kidney, or metabolic disease (e.g., diabetes), asthma, a blood disorder, no spleen, complement component deficiency, a cochlear implant, or a spinal fluid leak?  Are you on long-term aspirin therapy?   No   Do you have cancer, leukemia, HIV/AIDS, or any other immune system problem?   No   Do you have a parent, brother, or sister with an immune system problem?   No   In the past 3 months, have you taken medications that affect  your immune system, such as prednisone, other steroids, or anticancer drugs; drugs for the treatment of rheumatoid arthritis, Crohn s disease, or psoriasis; or have you had radiation treatments?   No   Have you had a seizure, or a brain or other nervous system problem?   No   During the past year, have you received a transfusion of blood or blood    products, or been given immune (gamma) globulin or antiviral drug?   No   For women: Are you pregnant or is there a chance you could become       pregnant during the next month?   No   Have you received any vaccinations in the past 4 weeks?   No     Immunization questionnaire answers were all negative.        Per orders of Tomasa Montez PA-C, injection of Shingrix given by Ivana Zhang. Patient instructed to remain in clinic for 15 minutes afterwards, and to report any adverse reaction to me immediately.       Screening performed by Ivana Zhang on 12/12/2022 at 10:05 AM.

## 2022-12-12 NOTE — PROGRESS NOTES
SUBJECTIVE:   CC: Monique is an 52 year old who presents for preventive health visit.     Healthy Habits:     Getting at least 3 servings of Calcium per day:  Yes    Bi-annual eye exam:  Yes    Dental care twice a year:  Yes    Sleep apnea or symptoms of sleep apnea:  None    Diet:  Regular (no restrictions)    Frequency of exercise:  None    Taking medications regularly:  Yes    Medication side effects:  Not applicable    PHQ-2 Total Score: 0    Additional concerns today:  No    Routine Health Maintenance:  Immunizations Due For:  Mammogram: last - unsure when last- years. Fam hx of breast cancer: no.   Colonoscopy: never done. Fam hx of colon cancer: no    Fasting: yes  Lipids screenin- normal. Fam hx- cad in mother at 68.   Diabetes screening: normal in past. Fam hx- mother. No GDM.     GYN Hx: s/p hyst. No longer needing paps   Sexually active: yes.   STD screening: no concerns , , monogamous   Denies GYN concerns of PCB, pelvic pain, dyspareunia, vaginal discharge    Work- hairstylist - hard on body.   For exercise: nothing regular; feels good with exercise. Limitations with exercise due to: I don't like exercise. Denies CV sxs with exercise including CP, SOB, palpitations, FRAZIER, presyncope.    S/p left thyoid lobectomy 2016 pathology Papillary Thyroid Cancer-  Last saw endocrine  2019.  Had imaging last 2020 and a biopsy of left sided lymph node that they were monitoring.   Last note from endocrine 2020 was in MyChart- advised follow up summer 2020 (in 6 month) . Pt never did see back.   Does not feel any nodules in neck, no pain, no pressure on neck or dysphagia.   Daughter had thyroid cancer at age 21. Hx of thyroid cancer on Monique's maternal side.     IBS- since in my 20s. Chronic issue. Diarrhea prone. Has cramping with it and that can last hours- can be worst part. Met with GI in the past. Tried amitriptyline- did not help. Controls with diet. Onions  trigger. Not had colonoscopy or celiac screening. Never bleeding w/BM. No weight loss due to it.   Imodium can help , but then constipates a few days- limits use. She has questions about use of medical marijuana - heard was being approved for IBS.       Today's PHQ-2 Score:   PHQ-2 (  [employer]) 2022   Q1: Little interest or pleasure in doing things 0   Q2: Feeling down, depressed or hopeless 0   PHQ-2 Score 0   PHQ-2 Total Score (12-17 Years)- Positive if 3 or more points; Administer PHQ-A if positive -   Q1: Little interest or pleasure in doing things Not at all   Q2: Feeling down, depressed or hopeless Not at all   PHQ-2 Score 0     Have you ever done Advance Care Planning? (For example, a Health Directive, POLST, or a discussion with a medical provider or your loved ones about your wishes): No, advance care planning information given to patient to review.  Patient declined advance care planning discussion at this time.    Social History     Tobacco Use     Smoking status: Former     Packs/day: 1.00     Years: 10.00     Pack years: 10.00     Types: Cigarettes     Quit date: 5/15/1997     Years since quittin.5     Smokeless tobacco: Never   Substance Use Topics     Alcohol use: Yes     Comment: A lillte / occasional - 3-4 per week       Alcohol Use 2022   Prescreen: >3 drinks/day or >7 drinks/week? No   Prescreen: >3 drinks/day or >7 drinks/week? -       Reviewed orders with patient.  Reviewed health maintenance and updated orders accordingly - Yes  Lab work is in process  Labs reviewed in EPIC  BP Readings from Last 3 Encounters:   22 122/82   07/15/19 123/81   16 98/66    Wt Readings from Last 3 Encounters:   22 87.2 kg (192 lb 4.8 oz)   07/15/19 77.7 kg (171 lb 4.8 oz)   16 74.3 kg (163 lb 12.8 oz)                  Patient Active Problem List   Diagnosis     Family history of thyroid cancer     Family history of thyroid disease     CARDIOVASCULAR SCREENING; LDL GOAL  LESS THAN 160     Irritable bowel syndrome with diarrhea     DDD (degenerative disc disease), lumbar     Spondylolisthesis, lumbar region     Pap smear of cervix not needed     H/O hysterectomy for benign disease     Papillary thyroid carcinoma (H)     Past Surgical History:   Procedure Laterality Date     BIOPSY  5/15 &     Both on thyroid     FUSION LUMBAR ANTERIOR ONE LEVEL N/A 2016    Procedure: FUSION LUMBAR ANTERIOR ONE LEVEL;  Surgeon: Kit Barrera MD;  Location: [facility] OR     GYN SURGERY  2008    Hysterectomy     HEAD & NECK SURGERY  3/11/16    Partial thyroid lobectomy, left lobe     HYSTERECTOMY  2008    heavy menses, fibroids     HYSTERECTOMY, PAP NO LONGER INDICATED       OPTICAL TRACKING SYSTEM FUSION SPINE POSTERIOR LUMBAR PERCUTANEOUS ONE LEVEL N/A 2016    Procedure: OPTICAL TRACKING SYSTEM FUSION SPINE POSTERIOR LUMBAR PERCUTANEOUS ONE LEVEL;  Surgeon: Kit Barrera MD;  Location: [facility] OR       Social History     Tobacco Use     Smoking status: Former     Packs/day: 1.00     Years: 10.00     Pack years: 10.00     Types: Cigarettes     Quit date: 5/15/1997     Years since quittin.5     Smokeless tobacco: Never   Substance Use Topics     Alcohol use: Yes     Comment: A lillte / occasional - 3-4 per week     Family History   Problem Relation Age of Onset     Diabetes Mother      Thyroid Disease Mother         goiter      Coronary Artery Disease Mother 68     Hypertension Father      Thyroid Disease Maternal Grandmother         Goiter      Thyroid Disease Daughter         thyrpoi CA, Dx 2014     Other Cancer Daughter      Thyroid Disease Daughter      Thyroid Disease Maternal Aunt         Thyroid CA,  -      Other Cancer Maternal Aunt      Other Cancer Cousin      Thyroid Disease Cousin         thyroid CA,      Other Cancer Other      Hyperlipidemia No family hx of      Breast Cancer No family hx of      Cancer - colorectal No family hx of       Ovarian Cancer No family hx of      Prostate Cancer No family hx of      Cerebrovascular Disease No family hx of      Asthma No family hx of      Known Genetic Syndrome No family hx of      Colon Cancer No family hx of      Anxiety Disorder No family hx of      Substance Abuse No family hx of      Anesthesia Reaction No family hx of          No current outpatient medications on file.     No Known Allergies    Breast Cancer Screening:    Breast CA Risk Assessment (FHS-7) 12/11/2022   Do you have a family history of breast, colon, or ovarian cancer? No / Unknown         Mammogram Screening: Recommended annual mammography  Pertinent mammograms are reviewed under the imaging tab.    History of abnormal Pap smear: Status post benign hysterectomy. Health Maintenance and Surgical History updated.     Reviewed and updated as needed this visit by clinical staff   Tobacco  Allergies  Meds              Reviewed and updated as needed this visit by Provider                     Review of Systems   Constitutional: Negative for chills and fever.   HENT: Negative for congestion, ear pain, hearing loss and sore throat.    Eyes: Negative for pain and visual disturbance.   Respiratory: Negative for cough and shortness of breath.    Cardiovascular: Negative for chest pain, palpitations and peripheral edema.   Gastrointestinal: Negative for abdominal pain, constipation, diarrhea, heartburn, hematochezia and nausea.   Breasts:  Negative for tenderness, breast mass and discharge.   Genitourinary: Negative for dysuria, frequency, genital sores, hematuria, pelvic pain, urgency, vaginal bleeding and vaginal discharge.   Musculoskeletal: Negative for arthralgias, joint swelling and myalgias.   Skin: Negative for rash.   Neurological: Negative for dizziness, weakness, headaches and paresthesias.   Psychiatric/Behavioral: Negative for mood changes. The patient is not nervous/anxious.         OBJECTIVE:   /82 (BP Location: Left arm,  "Patient Position: Chair, Cuff Size: Adult Regular)   Pulse 73   Temp 97.7  F (36.5  C) (Temporal)   Ht 1.625 m (5' 3.98\")   Wt 87.2 kg (192 lb 4.8 oz)   LMP  (Exact Date)   SpO2 98%   Breastfeeding No   BMI 33.03 kg/m    Physical Exam  GENERAL APPEARANCE: healthy, alert and no distress  EYES: Eyes grossly normal to inspection, PERRL and conjunctivae and sclerae normal  HENT: ear canals and TM's normal, nose and mouth without ulcers or lesions, oropharynx clear and oral mucous membranes moist  NECK: no adenopathy, no asymmetry, masses, or scars and thyroid normal to palpation  RESP: lungs clear to auscultation - no rales, rhonchi or wheezes  BREAST: normal without masses, tenderness or nipple discharge and no palpable axillary masses or adenopathy  CV: regular rate and rhythm, normal S1 S2, no S3 or S4, no murmur, click or rub, no peripheral edema and peripheral pulses strong  ABDOMEN: soft, nontender, no hepatosplenomegaly, no masses and bowel sounds normal   (female): she declines exam   MS: no musculoskeletal defects are noted and gait is age appropriate without ataxia  SKIN: no suspicious lesions or rashes  NEURO: Normal strength and tone, sensory exam grossly normal, mentation intact and speech normal  PSYCH: mentation appears normal and affect normal/bright    Diagnostic Test Results:  Labs reviewed in Epic  No results found for any visits on 12/12/22.    ASSESSMENT/PLAN:       ICD-10-CM    1. Routine general medical examination at a health care facility  Z00.00 REVIEW OF HEALTH MAINTENANCE PROTOCOL ORDERS     MA SCREENING DIGITAL BILAT - Future  (s+30)     ZOSTER VACCINE RECOMBINANT ADJUVANTED (SHINGRIX)     Lipid panel reflex to direct LDL Fasting     Colonoscopy Screening  Referral     Basic metabolic panel  (Ca, Cl, CO2, Creat, Gluc, K, Na, BUN)     CBC with platelets and differential     TSH with free T4 reflex      2. Papillary thyroid carcinoma (H)  C73 TSH with free T4 reflex      3. " Screen for colon cancer  Z12.11       4. Visit for screening mammogram  Z12.31 MA SCREENING DIGITAL BILAT - Future  (s+30)      5. Screening for hyperlipidemia  Z13.220 Lipid panel reflex to direct LDL Fasting      6. Irritable bowel syndrome with diarrhea  K58.0 Tissue transglutaminase prakash IgA and IgG     IgA     dicyclomine (BENTYL) 10 MG capsule        1. Routine general medical examination at a health care facility  Reviewed VS and weight/BMI with pt   Immunizations:  updated - see orders in chart/below ; discussed and pt declined flu and covid boosters  Counseling as below   Health screenings/maintenance per orders/comments below  When applicable based on age, personal hx, fam hx, and RF- counseled on appropriate cancer screenings.   Discussed risk factors and recommendations for screening for Hep C and HIV - declines (removed HMA)    - REVIEW OF HEALTH MAINTENANCE PROTOCOL ORDERS  - MA SCREENING DIGITAL BILAT - Future  (s+30); Future  - ZOSTER VACCINE RECOMBINANT ADJUVANTED (SHINGRIX)  - Lipid panel reflex to direct LDL Fasting; Future  - Colonoscopy Screening  Referral; Future  - Basic metabolic panel  (Ca, Cl, CO2, Creat, Gluc, K, Na, BUN); Future  - CBC with platelets and differential; Future  - TSH with free T4 reflex; Future    2. Papillary thyroid carcinoma (H)  Is overdue for follow up with endocrine for visit and likely monitoring imaging.   I routed a phone note to be sent on to endocrine RN team to get pt scheduled .   - TSH with free T4 reflex; Future    3. Screen for colon cancer  Counseled on screening recommendations. Discussed options including colonoscopy, cologuard, and FIT. Advised contacting insurance to verify coverage if pt has concerns. Pt understands that a positive cologuard or FIT test is followed ip with a diagnostic scope.Pt is interested in colonoscopy. Order placed.    4. Visit for screening mammogram  Overdue for screening. Ordered, she will call to lamar Meyers MA  SCREENING DIGITAL BILAT - Future  (s+30); Future    5. Screening for hyperlipidemia  Reviewed her last labs from 2015 with her. Update today. Heart healthy eating and exercise advised. fam hx in mother of CAD  - Lipid panel reflex to direct LDL Fasting; Future    6. Irritable bowel syndrome with diarrhea  Long standing for >20 yr.   Screen for celiac  Prior amitriptyline did not help  Rx to try bentyl for cramping and diarrhea prn  Offered ref back to GI - she will reach out if desired  She has questions about use of medical marijuana - heard was being approved for IBS- she will look up on state website.   - Tissue transglutaminase prakash IgA and IgG; Future  - IgA; Future  - dicyclomine (BENTYL) 10 MG capsule; Take 1 capsule (10 mg) by mouth 4 times daily as needed (for IBS symptoms)  Dispense: 30 capsule; Refill: 0        Patient has been advised of split billing requirements and indicates understanding: Yes      COUNSELING:  Reviewed preventive health counseling, as reflected in patient instructions       Regular exercise       Healthy diet/nutrition       Osteoporosis prevention/bone health       Colorectal Cancer Screening       mammogram, vaccines        She reports that she quit smoking about 25 years ago. Her smoking use included cigarettes. She has a 10.00 pack-year smoking history. She has never used smokeless tobacco.          KARUNA Lr [facility]

## 2022-12-12 NOTE — PATIENT INSTRUCTIONS
To schedule your Imaging Test or Specialty Referral please call: mammogram     [facility]   Radiology (imaging- mammogram, ultrasound, CT, MRI): [phone]  Speciality consultation [phone]  [address]  [place] [address]    [facility]  [address] [place] [address]  [phone]  Specialty consultation [phone]  Colonoscopy [phone]       Other Mammogram Options:  [place] Region Locations:   [place], [place], [place], [place]*, [place]* ([facility]), [facility]*-- Call to schedule [phone]    South Region Locations:  [place], [place], [facility] ([place], [place]*, [place], [facility] [place]*, [facility] [place]*, [place], [place], [place]-- Call to schedule [phone]     [facility] Locations:  [place]* and [facility]-- Call to schedule [phone]    *locations marked with a star have 3D mammography available      Preventive Health Recommendations  Female Ages 50 - 64    Yearly exam: See your health care provider every year in order to  Review health changes.   Discuss preventive care.    Review your medicines if your doctor has prescribed any.    Get a Pap test every three years (unless you have an abnormal result and your provider advises testing more often).  If you get Pap tests with HPV test, you only need to test every 5 years, unless you have an abnormal result.   You do not need a Pap test if your uterus was removed (hysterectomy) and you have not had cancer.  You should be tested each year for STDs (sexually transmitted diseases) if you're at risk.   Have a mammogram every 1 to 2 years.  Have a colonoscopy at age 50, or have a yearly FIT test (stool test). These exams screen for colon cancer.    Have a cholesterol test every 5 years, or more often if  advised.  Have a diabetes test (fasting glucose) every three years. If you are at risk for diabetes, you should have this test more often.   If you are at risk for osteoporosis (brittle bone disease), think about having a bone density scan (DEXA).    Shots: Get a flu shot each year. Get a tetanus shot every 10 years.    Nutrition:   Eat at least 5 servings of fruits and vegetables each day.  Eat whole-grain bread, whole-wheat pasta and brown rice instead of white grains and rice.  Get adequate Calcium and Vitamin D.     Lifestyle  Exercise at least 150 minutes a week (30 minutes a day, 5 days a week). This will help you control your weight and prevent disease.  Limit alcohol to one drink per day.  No smoking.   Wear sunscreen to prevent skin cancer.   See your dentist every six months for an exam and cleaning.  See your eye doctor every 1 to 2 years.

## 2022-12-13 LAB
IGA SERPL-MCNC: 224 MG/DL (ref 84–499)
TTG IGA SER-ACNC: 0.9 U/ML
TTG IGG SER-ACNC: <0.6 U/ML

## 2022-12-26 NOTE — TELEPHONE ENCOUNTER
Checking in on this scheduling need as it does not look to have been addressed.       Please assist pt in getting scheduled for follow up visit and follow up imaging.     Pt w/history of papillary thyroid carcinoma.   Last visit w/- 07/2019. Had follow up US neck and biopsy of lymph node 02/2020. Was advised follow up with Dr. Da Silva summer 2020 (6months).  Has not had follow up.

## 2022-12-27 DIAGNOSIS — C73 PAPILLARY THYROID CARCINOMA (H): Primary | Chronic | ICD-10-CM

## 2022-12-28 ENCOUNTER — TELEPHONE (OUTPATIENT)
Dept: ENDOCRINOLOGY | Facility: CLINIC | Age: 52
End: 2022-12-28
Payer: COMMERCIAL

## 2022-12-28 NOTE — TELEPHONE ENCOUNTER
Patient did not complete her thryoid biopsy or follow up with Dr. Davey in 2020.  Left message for patient that Dr. Davey placed a new order for a thyroid ultrasound to be completed along with a follow up visit with her to be scheduled.  Please schedule patient. Thank you.    Roxann Howe on 12/28/2022 at 1:21 PM      Tamica Da Silva MD  [facility] [place] 18 hours ago (6:24 PM)     TH  She should have ultrasound thyroid and f/up visit after that.     Thanks,   Tamica

## 2023-01-19 NOTE — TELEPHONE ENCOUNTER
Please reach out to patient one more time to schedule ultrasound and follow up visit with Dr. Davey.  If patient is being followed by another endocrinology office, please let us know. Thank you!    Roxann Howe on 1/19/2023 at 4:19 PM

## 2023-01-20 NOTE — TELEPHONE ENCOUNTER
Patient Contacted for the patient to call back and schedule the following:    Appointment type: Return Endo/US  Provider: Dr. Davey  Return date: n/a  Specialty phone number: n/a  Additional appointment(s) needed: n/a  Additonal Notes: n/a    Writer spoke with the patient. She is restricted with her insurance and isn't able to come to [place] for medical care.    She will contact her insurance to see where she can go, and contact us if there is anything she needs.    Zaynab R/Procedure    [facility]   Neurology, NeuroSurgery, NeuroPsychology and Pain Management Specialties  Medical/Surgical Adult Specialties

## 2023-01-23 ENCOUNTER — TELEPHONE (OUTPATIENT)
Dept: GASTROENTEROLOGY | Facility: CLINIC | Age: 53
End: 2023-01-23
Payer: COMMERCIAL

## 2023-01-23 NOTE — TELEPHONE ENCOUNTER
"    Screening Questions  BLUE  KIND OF PREP RED  LOCATION [review exclusion criteria] GREEN  SEDATION TYPE        Y Are you active on mychart?       LILIANA RENTERIA Ordering/Referring Provider?        PAO LARA What type of coverage do you have?      N Have you had a positive covid test in the last 14 days?     33.0 1. BMI  [BMI 40+ - review exclusion criteria]    Y  2. Are you able to give consent for your medical care? [IF NO,RN REVIEW]          N  3. Are you taking any prescription pain medications on a routine schedule   (ex narcotics: tramadol, oxycodone, roxicodone, oxycontin,  and percocet)?          3a. EXTENDED PREP What kind of prescription?     N 4. Do you have any chemical dependencies such as alcohol, street drugs, or methadone?        **If yes 3- 5 , please schedule with MAC sedation.**          IF YES TO ANY 6 - 10 - HOSPITAL SETTING ONLY.     N 6.   Do you need assistance transferring?     N 7.   Have you had a heart or lung transplant?    N 8.   Are you currently on dialysis?   N 9.   Do you use daily home oxygen?   N 10. Do you take nitroglycerin?   10a.  If yes, how often?     11. [FEMALES]  N Are you currently pregnant?    11a.  If yes, how many weeks? [ Greater than 12 weeks, OR NEEDED]    N 12. Do you have Pulmonary Hypertension? *NEED PAC APPT AT [facility]*     N 13. [review exclusion criteria]  Do you have any implantable devices in your body (pacemaker, defib, LVAD)?    N 14. In the past 6 months, have you had any heart related issues including cardiomyopathy or heart attack?     14a.  If yes, did it require cardiac stenting if so when?     N 15. Have you had a stroke or Transient ischemic attack (TIA - aka  mini stroke ) within 6 months?      N 16. Do you have mod to severe Obstructive Sleep Apnea?  [Hospital only]    N 17. Do you have SEVERE AND UNCONTROLLED asthma? *NEED PAC APPT AT [facility]*     N 18. Are you currently taking any blood thinners?     18a. If yes, inform patient to \"follow up " "w/ ordering provider for bridging instructions.\"    N 19. Do you take the medication Phentermine?    19a. If yes, \"Hold for 7 days before procedure.  Please consult your prescribing provider if you have questions about holding this medication.\"     N  20. Do you have chronic kidney disease?      N  21. Do you have a diagnosis of diabetes?     N  22. On a regular basis do you go 3-5 days between bowel movements?      23. Preferred LOCAL Pharmacy for Pre Prescription    [ LIST ONLY ONE PHARMACY]     [facility] [address]        - CLOSING REMINDERS -    Informed patient they will need an adult    Cannot take any type of public or medical transportation alone    Conscious Sedation- Needs  for 6 hours after the procedure       MAC/General-Needs  for 24 hours after procedure    Pre-Procedure Covid test to be completed [[facility] PCR Testing Required]    Confirmed Nurse will call to complete assessment       - SCHEDULING DETAILS -  NO Hospital Setting Required? If yes, what is the exclusion?:    VIDA  Surgeon    2/13/2023  Date of Procedure  Lower Endoscopy [Colonoscopy]  Type of Procedure Scheduled  [facility]-If you answer yes to questions #8, #20, #21Which Colonoscopy Prep was Sent?     MODERATE Sedation Type     NO PAC / Pre-op Required                 "

## 2023-02-02 ENCOUNTER — TELEPHONE (OUTPATIENT)
Dept: GASTROENTEROLOGY | Facility: CLINIC | Age: 53
End: 2023-02-02
Payer: COMMERCIAL

## 2023-02-02 DIAGNOSIS — Z00.00 ROUTINE GENERAL MEDICAL EXAMINATION AT A HEALTH CARE FACILITY: Primary | ICD-10-CM

## 2023-02-02 RX ORDER — BISACODYL 5 MG
TABLET, DELAYED RELEASE (ENTERIC COATED) ORAL
Qty: 4 TABLET | Refills: 0 | Status: SHIPPED | OUTPATIENT
Start: 2023-02-02 | End: 2023-02-13

## 2023-02-02 NOTE — TELEPHONE ENCOUNTER
Attempted to contact patient regarding upcoming Colonoscopy  procedure on 02.13.2023 for pre assessment questions. No answer.     Left message to return call to [phone] #4    Discuss Covid policy and designated  policy.    Pre op exam scheduled: N/A    Arrival time: 0945. Procedure time: 1030    Facility location: [facility]; [address] Floor, [place] [address]    Sedation type: Conscious sedation     Anticoagulants: No    Electronic implanted devices? No    Diabetic? No    Indication for procedure: screening colonoscopy    Bowel prep recommendation: Standard [place]      Prep instructions sent via [employer]. Bowel prep script sent to [facility] [id number] [address] [place], [place] - [address]       Love Armando RN  Endoscopy Procedure Pre Assessment RN

## 2023-02-03 NOTE — TELEPHONE ENCOUNTER
Patient returned call.     Pre assessment questions completed for upcoming Colonoscopy  procedure scheduled on 2/13/2023    COVID policy reviewed.     Pre-op scheduled  N/A    Reviewed procedural arrival time 0945, procedure time 1030 and facility location [facility]; [address], [place] [address]    Designated  policy reviewed. Instructed to have someone stay 6 hours post procedure.     Reviewed procedure prep instructions.     Prep instructions sent via [employer].      Patient verbalized understanding and had no questions or concerns at this time.    Love Enamorado RN  Endoscopy Procedure Pre Assessment RN

## 2023-02-13 ENCOUNTER — HOSPITAL ENCOUNTER (OUTPATIENT)
Facility: AMBULATORY SURGERY CENTER | Age: 53
Discharge: HOME | End: 2023-02-13
Attending: STUDENT IN AN ORGANIZED HEALTH CARE EDUCATION/TRAINING PROGRAM | Admitting: STUDENT IN AN ORGANIZED HEALTH CARE EDUCATION/TRAINING PROGRAM
Payer: COMMERCIAL

## 2023-02-13 VITALS
DIASTOLIC BLOOD PRESSURE: 65 MMHG | OXYGEN SATURATION: 96 % | RESPIRATION RATE: 16 BRPM | TEMPERATURE: 97.8 F | HEART RATE: 81 BPM | SYSTOLIC BLOOD PRESSURE: 117 MMHG

## 2023-02-13 LAB — COLONOSCOPY: NORMAL

## 2023-02-13 PROCEDURE — 45380 COLONOSCOPY AND BIOPSY: CPT

## 2023-02-13 PROCEDURE — 88305 TISSUE EXAM BY PATHOLOGIST: CPT | Performed by: PATHOLOGY

## 2023-02-13 PROCEDURE — G8918 PT W/O PREOP ORDER IV AB PRO: HCPCS

## 2023-02-13 PROCEDURE — G8907 PT DOC NO EVENTS ON DISCHARG: HCPCS

## 2023-02-13 RX ORDER — IBUPROFEN 200 MG
200 TABLET ORAL EVERY 4 HOURS PRN
COMMUNITY

## 2023-02-13 RX ORDER — FLUMAZENIL 0.1 MG/ML
0.2 INJECTION, SOLUTION INTRAVENOUS
Status: DISCONTINUED | OUTPATIENT
Start: 2023-02-13 | End: 2023-02-14 | Stop reason: HOSPADM

## 2023-02-13 RX ORDER — NALOXONE HYDROCHLORIDE 0.4 MG/ML
0.2 INJECTION, SOLUTION INTRAMUSCULAR; INTRAVENOUS; SUBCUTANEOUS
Status: DISCONTINUED | OUTPATIENT
Start: 2023-02-13 | End: 2023-02-14 | Stop reason: HOSPADM

## 2023-02-13 RX ORDER — FENTANYL CITRATE 50 UG/ML
INJECTION, SOLUTION INTRAMUSCULAR; INTRAVENOUS PRN
Status: DISCONTINUED | OUTPATIENT
Start: 2023-02-13 | End: 2023-02-13 | Stop reason: HOSPADM

## 2023-02-13 RX ORDER — ONDANSETRON 2 MG/ML
4 INJECTION INTRAMUSCULAR; INTRAVENOUS EVERY 6 HOURS PRN
Status: DISCONTINUED | OUTPATIENT
Start: 2023-02-13 | End: 2023-02-14 | Stop reason: HOSPADM

## 2023-02-13 RX ORDER — ACETAMINOPHEN 500 MG
500-1000 TABLET ORAL EVERY 6 HOURS PRN
COMMUNITY

## 2023-02-13 RX ORDER — SODIUM CHLORIDE, SODIUM LACTATE, POTASSIUM CHLORIDE, CALCIUM CHLORIDE 600; 310; 30; 20 MG/100ML; MG/100ML; MG/100ML; MG/100ML
INJECTION, SOLUTION INTRAVENOUS CONTINUOUS
Status: DISCONTINUED | OUTPATIENT
Start: 2023-02-13 | End: 2023-02-14 | Stop reason: HOSPADM

## 2023-02-13 RX ORDER — LIDOCAINE 40 MG/G
CREAM TOPICAL
Status: DISCONTINUED | OUTPATIENT
Start: 2023-02-13 | End: 2023-02-14 | Stop reason: HOSPADM

## 2023-02-13 RX ORDER — NALOXONE HYDROCHLORIDE 0.4 MG/ML
0.4 INJECTION, SOLUTION INTRAMUSCULAR; INTRAVENOUS; SUBCUTANEOUS
Status: DISCONTINUED | OUTPATIENT
Start: 2023-02-13 | End: 2023-02-14 | Stop reason: HOSPADM

## 2023-02-13 RX ORDER — PROCHLORPERAZINE MALEATE 10 MG
10 TABLET ORAL EVERY 6 HOURS PRN
Status: DISCONTINUED | OUTPATIENT
Start: 2023-02-13 | End: 2023-02-14 | Stop reason: HOSPADM

## 2023-02-13 RX ORDER — ONDANSETRON 4 MG/1
4 TABLET, ORALLY DISINTEGRATING ORAL EVERY 6 HOURS PRN
Status: DISCONTINUED | OUTPATIENT
Start: 2023-02-13 | End: 2023-02-14 | Stop reason: HOSPADM

## 2023-02-13 RX ORDER — ONDANSETRON 2 MG/ML
4 INJECTION INTRAMUSCULAR; INTRAVENOUS
Status: DISCONTINUED | OUTPATIENT
Start: 2023-02-13 | End: 2023-02-14 | Stop reason: HOSPADM

## 2023-02-13 NOTE — LETTER
"February 15, 2023      Monique Tyler  [address] [place] [address]        Dear ,    We are writing to inform you of your test results.    The biopsies of your colon returned as normal, without evidence of microscopic colitis.     Resulted Orders   Surgical Pathology Exam   Result Value Ref Range    Case Report       Surgical Pathology Report                         Case: [id number]                                  Authorizing Provider:  Griselda Yepez MD          Collected:           02/13/2023 11:54 AM          Ordering Location:     [facility]    Received:            02/13/2023 03:37 PM                                 [facility]                                                                     Pathologist:           Isidro Rios DO                                                            Specimen:    Large Intestine, Colon, Random colon bx, evaluate for microscopic colitis                  Final Diagnosis       A. COLON, RANDOM, BIOPSY:  - Unremarkable colonic mucosa  - No significant acute cryptitis, chronic changes, or microscopic colitis identified        Clinical Information       Evaluate for microscopic colitis      Gross Description       A(1). Large Intestine, Colon, Random colon bx, evaluate for microscopic colitis:  The specimen is received in formalin with proper patient identification, labeled \"random colon BX, evaluate for microscopic colitis\".  The specimen consists of 6 tan-white soft tissue fragments ranging from 0.3 to 0.4 cm in greatest dimension.  Entirely submitted in A1.       Microscopic Description       Microscopic examination was performed.        Performing Labs       The technical component of this testing was completed at [facility]      Case Images         If you have any questions or concerns, please call the clinic at the number listed above.       Sincerely,      Griselda" MD Luch[place]

## 2023-02-13 NOTE — H&P
Cuca SERRATO [facility]  [id number]  female  52 year old      Reason for procedure/surgery: screening colonoscopy  Mom had polyps, unknown age or type  +diarrhea    Patient Active Problem List   Diagnosis     Family history of thyroid cancer     Family history of thyroid disease     CARDIOVASCULAR SCREENING; LDL GOAL LESS THAN 160     Irritable bowel syndrome with diarrhea     DDD (degenerative disc disease), lumbar     Spondylolisthesis, lumbar region     Pap smear of cervix not needed     H/O hysterectomy for benign disease     Papillary thyroid carcinoma (H)       Past Surgical History:    Past Surgical History:   Procedure Laterality Date     BIOPSY  5/15 &     Both on thyroid     FUSION LUMBAR ANTERIOR ONE LEVEL N/A 2016    Procedure: FUSION LUMBAR ANTERIOR ONE LEVEL;  Surgeon: Kit Barrera MD;  Location: [facility] OR     GYN SURGERY  2008    Hysterectomy     HEAD & NECK SURGERY  3/11/16    Partial thyroid lobectomy, left lobe     HYSTERECTOMY  2008    heavy menses, fibroids     HYSTERECTOMY, PAP NO LONGER INDICATED       OPTICAL TRACKING SYSTEM FUSION SPINE POSTERIOR LUMBAR PERCUTANEOUS ONE LEVEL N/A 2016    Procedure: OPTICAL TRACKING SYSTEM FUSION SPINE POSTERIOR LUMBAR PERCUTANEOUS ONE LEVEL;  Surgeon: Kit Barrera MD;  Location: [facility] OR       Past Medical History:   Past Medical History:   Diagnosis Date     Cancer (H)     Thyroid, my aunt & daughter     DDD (degenerative disc disease), lumbar 10/31/2016     Diabetes (H)     Mom     H/O hysterectomy for benign disease      Motion sickness      Pap smear of cervix not needed      PONV (postoperative nausea and vomiting)      Thyroid nodule 2015       Social History:   Social History     Tobacco Use     Smoking status: Former     Packs/day: 1.00     Years: 10.00     Pack years: 10.00     Types: Cigarettes     Quit date: 5/15/1997     Years since quittin.7     Smokeless tobacco: Never   Substance Use Topics      Alcohol use: Yes     Comment: A lillte / occasional - 3-4 per week       Family History:   Family History   Problem Relation Age of Onset     Diabetes Mother      Thyroid Disease Mother         goiter      Coronary Artery Disease Mother 68     Hypertension Father      Thyroid Disease Maternal Grandmother         Goiter      Thyroid Disease Daughter         thyrpoi CA, Dx 2/2014     Other Cancer Daughter      Thyroid Disease Daughter      Thyroid Disease Maternal Aunt         Thyroid CA, 2013 - 2014     Other Cancer Maternal Aunt      Other Cancer Cousin      Thyroid Disease Cousin         thyroid CA,      Other Cancer Other      Hyperlipidemia No family hx of      Breast Cancer No family hx of      Cancer - colorectal No family hx of      Ovarian Cancer No family hx of      Prostate Cancer No family hx of      Cerebrovascular Disease No family hx of      Asthma No family hx of      Known Genetic Syndrome No family hx of      Colon Cancer No family hx of      Anxiety Disorder No family hx of      Substance Abuse No family hx of      Anesthesia Reaction No family hx of        Allergies: No Known Allergies    Active Medications:   Current Outpatient Medications   Medication Sig Dispense Refill     acetaminophen (TYLENOL) 500 MG tablet Take 500-1,000 mg by mouth every 6 hours as needed for mild pain       dicyclomine (BENTYL) 10 MG capsule Take 1 capsule (10 mg) by mouth 4 times daily as needed (for IBS symptoms) 30 capsule 0     ibuprofen (ADVIL/MOTRIN) 200 MG tablet Take 200 mg by mouth every 4 hours as needed for pain         Systemic Review:   CONSTITUTIONAL: NEGATIVE for fever, chills, change in weight  ENT/MOUTH: NEGATIVE for ear, mouth and throat problems  RESP: NEGATIVE for significant cough or SOB  CV: NEGATIVE for chest pain, palpitations or peripheral edema    Physical Examination:   Vital Signs: /81   Temp 97.3  F (36.3  C) (Temporal)   Resp 18   SpO2 96%   GENERAL: healthy, alert and no  distress  NECK: no adenopathy, no asymmetry, masses, or scars  RESP: lungs clear to auscultation - no rales, rhonchi or wheezes  CV: regular rate and rhythm, normal S1 S2, no S3 or S4, no murmur, click or rub, no peripheral edema and peripheral pulses strong  ABDOMEN: soft, nontender, no hepatosplenomegaly, no masses and bowel sounds normal  MS: no gross musculoskeletal defects noted, no edema    Plan: Appropriate to proceed as scheduled.      Griselda Yepez MD  2/13/2023    PCP:  Tomasa Montez

## 2023-02-15 LAB
PATH REPORT.COMMENTS IMP SPEC: NORMAL
PATH REPORT.COMMENTS IMP SPEC: NORMAL
PATH REPORT.FINAL DX SPEC: NORMAL
PATH REPORT.GROSS SPEC: NORMAL
PATH REPORT.MICROSCOPIC SPEC OTHER STN: NORMAL
PATH REPORT.RELEVANT HX SPEC: NORMAL
PHOTO IMAGE: NORMAL

## 2023-04-24 ENCOUNTER — ANCILLARY PROCEDURE (OUTPATIENT)
Dept: MAMMOGRAPHY | Facility: CLINIC | Age: 53
End: 2023-04-24
Attending: PHYSICIAN ASSISTANT
Payer: COMMERCIAL

## 2023-04-24 DIAGNOSIS — Z12.31 VISIT FOR SCREENING MAMMOGRAM: ICD-10-CM

## 2023-04-24 DIAGNOSIS — Z00.00 ROUTINE GENERAL MEDICAL EXAMINATION AT A HEALTH CARE FACILITY: ICD-10-CM

## 2023-04-24 PROCEDURE — 77067 SCR MAMMO BI INCL CAD: CPT

## 2023-12-27 ENCOUNTER — NURSE TRIAGE (OUTPATIENT)
Dept: FAMILY MEDICINE | Facility: CLINIC | Age: 53
End: 2023-12-27
Payer: COMMERCIAL

## 2023-12-27 ENCOUNTER — MYC MEDICAL ADVICE (OUTPATIENT)
Dept: FAMILY MEDICINE | Facility: CLINIC | Age: 53
End: 2023-12-27
Payer: COMMERCIAL

## 2023-12-27 NOTE — TELEPHONE ENCOUNTER
"Are you able to order thyroid labs as request by patient for noted symptoms. If appointment needed are you able to work in or okay to use appointment with covering provider? Appointment next week appropriate? Recommend UC?    Nurse Triage SBAR    Is this a 2nd Level Triage? YES, LICENSED PRACTITIONER REVIEW IS REQUIRED    Situation:   Called out to patient regarding [place] message.   \"I have been not been feeling right lately and I m wondering if I can come in this coming Thursday the 28th to the lab to have my thyroid levels checked?\"    Background:  Patient reports that she has been \"super tired\" the last few three weeks and has had episodes of dizziness that come and go. Denies dizziness presently. Patient also reports occasional nausea.    \"I can sleep 8-10 hour and still be really tired\"    Patient states fatigue is her worse symptom.   Patient reports that he thyroid has always been normal but she \"googled\" thyroid symptoms and her symptoms are consistent with possible thyroid problems and she has been advised in the past to contact her provider if she ever experiences symptoms.    Covid negative x2    Drinks a lot of water  No new meds   No new diagnoses  No trouble sleeping    Assessment:   Patient should be seen within 3 business days per protocol. Patient requesting lab appointment but is open to provider appointment if recommended. Patient is also open to covering provider if PCP unable to work her in. RN advised she would follow up with PCP and call back. If unable to work in or has new or worsening symptoms recommend evaluation in UC. Patient verbalized understanding and all questions answered.     Protocol Recommended Disposition:   See in Office Within 3 Days    Recommendation:   Are you able to order thyroid labs as request by patient for noted symptoms. If appointment needed are you able to work in or okay to use appointment with covering provider? Appointment next week appropriate? Recommend " UC?    Routed to provider    Does the patient meet one of the following criteria for ADS visit consideration? 16+ years old, with an [facility] PCP     BETAA Cage, RN  [facility] ~ Registered Nurse  Clinic Triage ~ [place] [facility] [place]  December 27, 2023    Reason for Disposition   Fatigue (i.e., tires easily, decreased energy) and persists > 1 week    Additional Information   Negative: SEVERE difficulty breathing (e.g., struggling for each breath, speaks in single words)   Negative: Shock suspected (e.g., cold/pale/clammy skin, too weak to stand, low BP, rapid pulse)   Negative: Difficult to awaken or acting confused (e.g., disoriented, slurred speech)   Negative: Fainted > 15 minutes ago and still feels too weak or dizzy to stand   Negative: SEVERE weakness (i.e., unable to walk or barely able to walk, requires support) and new-onset or worsening   Negative: Sounds like a life-threatening emergency to the triager   Negative: Weakness of the face, arm or leg on one side of the body   Negative: Has diabetes mellitus and weakness from low blood sugar (i.e., < 60 mg/dL or 3.5 mmol/L)   Negative: Recent heat exposure, suspected cause of weakness   Negative: Vomiting is main symptom   Negative: Diarrhea is main symptom   Negative: Difficulty breathing   Negative: Heart beating < 50 beats per minute OR > 140 beats per minute   Negative: Extra heartbeats, irregular heart beating, or heart is beating very fast (i.e., 'palpitations')   Negative: Follows large amount of bleeding (e.g., from vomiting, rectum, vagina) (Exception: Small transient weakness from sight of a small amount blood.)   Negative: Black or tarry bowel movements   Negative: MODERATE weakness or fatigue from poor fluid intake with no improvement after 2 hours of rest and fluids   Negative: Drinking very little and dehydration suspected (e.g., no urine > 12 hours, very dry mouth, very lightheaded)   Negative: Patient sounds very sick or weak to the  "triager   Negative: MODERATE weakness (i.e., interferes with work, school, normal activities) and cause unknown (Exceptions: Weakness from acute minor illness or from poor fluid intake; weakness is chronic and not worse.)   Negative: Fever > 103 F  (39.4 C) and not able to get the Fever down using CARE ADVICE   Negative: Fever > 100.0 F (37.8 C) and bedridden (e.g., CVA, chronic illness, recovering from surgery)   Negative: Fever > 101 F (38.3 C) and over 60 years of age   Negative: Fever > 100.0 F (37.8 C) and diabetes mellitus or weak immune system (e.g., HIV positive, cancer chemo, splenectomy, organ transplant, chronic steroids)   Negative: Pale skin (pallor)   Negative: MODERATE weakness (i.e., interferes with work, school, normal activities) and persists > 3 days   Negative: Taking a medicine that could cause weakness (e.g., blood pressure medications, diuretics)   Negative: Patient wants to be seen   Negative: MILD weakness (i.e., does not interfere with ability to work, go to school, normal activities) and persists > 1 week    Answer Assessment - Initial Assessment Questions  1. DESCRIPTION: \"Describe how you are feeling.\"      \"I can sleep 8-10 hour and still be really tired\"    2. SEVERITY: \"How bad is it?\"  \"Can you stand and walk?\"    - MILD (0-3): Feels weak or tired, but does not interfere with work, school or normal activities.    - MODERATE (4-7): Able to stand and walk; weakness interferes with work, school, or normal activities.    - SEVERE (8-10): Unable to stand or walk; unable to do usual activities.      No weakness  Able to ambulate as normal     3. ONSET: \"When did these symptoms begin?\" (e.g., hours, days, weeks, months)      3 weeks ago    4. CAUSE: \"What do you think is causing the weakness or fatigue?\" (e.g., not drinking enough fluids, medical problem, trouble sleeping)  Drinks a lot of water  No new diagnoses  No trouble sleeping    5. NEW MEDICINES:  \"Have you started on any new " "medicines recently?\" (e.g., opioid pain medicines, benzodiazepines, muscle relaxants, antidepressants, antihistamines, neuroleptics, beta blockers)      No new meds     6. OTHER SYMPTOMS: \"Do you have any other symptoms?\" (e.g., chest pain, fever, cough, SOB, vomiting, diarrhea, bleeding, other areas of pain)  Dizziness  Nausea  Denies all other symptoms        7. PREGNANCY: \"Is there any chance you are pregnant?\" \"When was your last menstrual period?\"      No, hysterectomy    Protocols used: Weakness (Generalized) and Fatigue-A-OH    "

## 2023-12-27 NOTE — TELEPHONE ENCOUNTER
I would recommend an in person visit with exam and other labs to consider in addition to the thyroid.  Urgent care may not do a thyroid.   I am full tomorrow. I have availability 1/03/23 but if needs to be seen sooner should be seen by colleague with availability.   Tomasa Montez PA-C

## 2023-12-27 NOTE — TELEPHONE ENCOUNTER
Placed call to patient. RN advised of the below.   Patient in agreement to be seen next week with PCP.   Scheduled.     Patient verbalized understanding and all questions answered.     Appointments in Next Year      Jan 03, 2024 10:30 AM  (Arrive by 10:10 AM)  Provider Visit with Tomasa Montez PA-C  [facility] [phone]     Jan 29, 2024 10:00 AM  (Arrive by 9:40 AM)  Adult Preventative Visit with Tomasa Montez PA-C  [facility] [phone]          BEATA Cage, RN  [facility] ~ Registered Nurse  Clinic Triage ~ [place] [facility]  December 27, 2023

## 2023-12-27 NOTE — TELEPHONE ENCOUNTER
See triage encounter 12/27/23    SANJUANA CageN, RN  [facility] ~ Registered Nurse  Clinic Triage ~ [place] [facility] [place]  December 27, 2023

## 2024-01-03 ENCOUNTER — OFFICE VISIT (OUTPATIENT)
Dept: FAMILY MEDICINE | Facility: CLINIC | Age: 54
End: 2024-01-03
Payer: COMMERCIAL

## 2024-01-03 VITALS
TEMPERATURE: 97.7 F | BODY MASS INDEX: 32.95 KG/M2 | SYSTOLIC BLOOD PRESSURE: 124 MMHG | HEIGHT: 64 IN | HEART RATE: 74 BPM | WEIGHT: 193 LBS | RESPIRATION RATE: 16 BRPM | DIASTOLIC BLOOD PRESSURE: 64 MMHG | OXYGEN SATURATION: 97 %

## 2024-01-03 DIAGNOSIS — C73 PAPILLARY THYROID CARCINOMA (H): Chronic | ICD-10-CM

## 2024-01-03 DIAGNOSIS — R53.83 FATIGUE, UNSPECIFIED TYPE: Primary | ICD-10-CM

## 2024-01-03 DIAGNOSIS — F19.90 EXCESSIVE USE OF NONSTEROIDAL ANTI-INFLAMMATORY DRUGS (NSAIDS): ICD-10-CM

## 2024-01-03 DIAGNOSIS — R42 DIZZINESS: ICD-10-CM

## 2024-01-03 DIAGNOSIS — R74.8 ELEVATED LIVER ENZYMES: ICD-10-CM

## 2024-01-03 LAB
ALBUMIN SERPL BCG-MCNC: 4.4 G/DL (ref 3.5–5.2)
ALP SERPL-CCNC: 64 U/L (ref 40–150)
ALT SERPL W P-5'-P-CCNC: 152 U/L (ref 0–50)
ANION GAP SERPL CALCULATED.3IONS-SCNC: 10 MMOL/L (ref 7–15)
AST SERPL W P-5'-P-CCNC: 71 U/L (ref 0–45)
BILIRUB SERPL-MCNC: 0.4 MG/DL
BUN SERPL-MCNC: 14.9 MG/DL (ref 6–20)
CALCIUM SERPL-MCNC: 9.6 MG/DL (ref 8.6–10)
CHLORIDE SERPL-SCNC: 103 MMOL/L (ref 98–107)
CREAT SERPL-MCNC: 0.61 MG/DL (ref 0.51–0.95)
DEPRECATED HCO3 PLAS-SCNC: 28 MMOL/L (ref 22–29)
EGFRCR SERPLBLD CKD-EPI 2021: >90 ML/MIN/1.73M2
ERYTHROCYTE [DISTWIDTH] IN BLOOD BY AUTOMATED COUNT: 11.9 % (ref 10–15)
FERRITIN SERPL-MCNC: 261 NG/ML (ref 11–328)
GLUCOSE SERPL-MCNC: 98 MG/DL (ref 70–99)
HCT VFR BLD AUTO: 41.8 % (ref 35–47)
HGB BLD-MCNC: 14.1 G/DL (ref 11.7–15.7)
IRON BINDING CAPACITY (ROCHE): 333 UG/DL (ref 240–430)
IRON SATN MFR SERPL: 39 % (ref 15–46)
IRON SERPL-MCNC: 131 UG/DL (ref 37–145)
MCH RBC QN AUTO: 30.8 PG (ref 26.5–33)
MCHC RBC AUTO-ENTMCNC: 33.7 G/DL (ref 31.5–36.5)
MCV RBC AUTO: 91 FL (ref 78–100)
PLATELET # BLD AUTO: 262 10E3/UL (ref 150–450)
POTASSIUM SERPL-SCNC: 4.3 MMOL/L (ref 3.4–5.3)
PROT SERPL-MCNC: 7.2 G/DL (ref 6.4–8.3)
RBC # BLD AUTO: 4.58 10E6/UL (ref 3.8–5.2)
SODIUM SERPL-SCNC: 141 MMOL/L (ref 135–145)
TSH SERPL DL<=0.005 MIU/L-ACNC: 1.18 UIU/ML (ref 0.3–4.2)
WBC # BLD AUTO: 5.5 10E3/UL (ref 4–11)

## 2024-01-03 PROCEDURE — 85027 COMPLETE CBC AUTOMATED: CPT | Performed by: PHYSICIAN ASSISTANT

## 2024-01-03 PROCEDURE — 36415 COLL VENOUS BLD VENIPUNCTURE: CPT | Performed by: PHYSICIAN ASSISTANT

## 2024-01-03 PROCEDURE — 83540 ASSAY OF IRON: CPT | Performed by: PHYSICIAN ASSISTANT

## 2024-01-03 PROCEDURE — 83550 IRON BINDING TEST: CPT | Performed by: PHYSICIAN ASSISTANT

## 2024-01-03 PROCEDURE — 80053 COMPREHEN METABOLIC PANEL: CPT | Performed by: PHYSICIAN ASSISTANT

## 2024-01-03 PROCEDURE — 84443 ASSAY THYROID STIM HORMONE: CPT | Performed by: PHYSICIAN ASSISTANT

## 2024-01-03 PROCEDURE — 82728 ASSAY OF FERRITIN: CPT | Performed by: PHYSICIAN ASSISTANT

## 2024-01-03 PROCEDURE — 99214 OFFICE O/P EST MOD 30 MIN: CPT | Performed by: PHYSICIAN ASSISTANT

## 2024-01-03 ASSESSMENT — ENCOUNTER SYMPTOMS
FATIGUE: 1
DIZZINESS: 1
NAUSEA: 1

## 2024-01-03 NOTE — PROGRESS NOTES
"  Assessment & Plan     Fatigue, unspecified type  Dizziness  Nausea  No anemia- hgb is normal, iron is normal, ferritin is normal.   Thyroid is normal.  Was working quite a bit and not eating well. Has felt better since increasing her dietary iron and protein.   Her liver enzymes returned elevated. Plan for hepatitis labs and US.   Nausea may be related to ibuprofen over use. Which she has discontinued.    - Comprehensive metabolic panel; Future  - CBC with platelets; Future  - Iron & Iron Binding Capacity; Future  - Ferritin; Future  - TSH with free T4 reflex; Future  - Comprehensive metabolic panel  - CBC with platelets  - Iron & Iron Binding Capacity  - Ferritin  - TSH with free T4 reflex  - CBC with platelets; Future  - Iron & Iron Binding Capacity; Future  - Ferritin; Future  - TSH with free T4 reflex; Future  - CBC with platelets  - Iron & Iron Binding Capacity  - Ferritin  - TSH with free T4 reflex    Papillary thyroid carcinoma (H)  She is overdue for follow up visit with endocrine and monitoring US. She was referred back last year but didn't follow through due to some coverage issues with her insurance. Did again today advise she schedule her follow up.   Did place a re-referral back to endocrinology.     Elevated liver enzymes  Called pt - about labs.  She is not on statin. Has not been  using tylenol  She does take an \"HHC\" supplement at bedtime. Advised to stop it for now.   Plan for US- order placed.   Repeat hepatic panel in 1 week and will add on hepatitis labs. Orders placed    - US Abdomen Limited; Future  - Hepatitis B core antibody; Future  - Hepatitis B Surface Antibody; Future  - Hepatitis B surface antigen; Future  - Hepatitis C Screen Reflex to HCV RNA Quant and Genotype; Future  - Hepatitis A antibody IgM; Future    Excessive use of nonsteroidal anti-inflammatory drugs (NSAIDs)  Discussed risks with chronic nsaid use- may be a contributing factor to her nausea.   Discussed acetaminophen as " "preferred daily med for arthritis pain.   If using nsaids, take with food, hydrate well, use on more sparing basis.       BMI:   Estimated body mass index is 33.15 kg/m  as calculated from the following:    Height as of this encounter: 1.625 m (5' 3.98\").    Weight as of this encounter: 87.5 kg (193 lb).   Weight management plan: Discussed healthy diet and exercise guidelines    Follow Up: see above. Additionally patient was instructed to contact clinic for worsening symptoms, non-improvement in time frame discussed, and for questions regarding treatment plan.   For virtual visits, the patient was advised to be seen for in person evaluation if symptoms or condition are worsening or non-improvement as expected.       [facility] RAUL Amaya is a 53 year old, presenting for the following health issues:  Fatigue, Nausea, and Dizziness      1/3/2024    10:17 AM   Additional Questions   Roomed by Emir WHITE CMA   Accompanied by Self         1/3/2024    10:17 AM   Patient Reported Additional Medications   Patient reports taking the following new medications n/a       Fatigue  Associated symptoms include fatigue and nausea.   Nausea  Associated symptoms include fatigue and nausea.   Dizziness  Associated symptoms include fatigue and nausea.   History of Present Illness       Reason for visit:  Fatigue, dizziness, nausea  Symptom onset:  3-4 weeks ago  Symptom intensity:  Moderate  Symptom progression:  Worsening  Had these symptoms before:  No    She eats 0-1 servings of fruits and vegetables daily.She consumes 0 sweetened beverage(s) daily.She exercises with enough effort to increase her heart rate 9 or less minutes per day.  She exercises with enough effort to increase her heart rate 3 or less days per week.   She is taking medications regularly.     Started 1 mo ago- a tired feeling and kind of nauseated and dizzy now and again.  Tiredness she attributed to stress. " "  Working fulltime as [employer].   Sx worsened over the month and the week before sandro was tired in way has never felt before. Was getting 10 hr sleep a night and then still very fatigued.  And in that week felt dizzy and nauseous frequently.   Dizziness- a lightheaded wobbly. Not vertigo.   Hydrates well - drinks water all day.   Diet wasn't great- spouse travels and just me and my 17yo- quick meals.   I do take a lot of ibuprofen- 4-8 tabs daily (usually 800mg in morning and before bed). Not always eating with it. Stopped it a few days ago.   Never any abd pain.   She googled her sx - and sent my chart about testing a thyroid. We advised a visit. She then read about anemia, and started to increase her dietary iron last week and feels like that has helped her sx and has been feeling better.   S/p hyst. No other abnormal bleeding.   Did start a childrens flintstone with iron multivit a few days ago.     Hx of papillary thyroid cancer- s/p left lobectomy 2016  Needs US and follow up with endocrinologist. Was referred back to endocrine in 2022 but didn't do the visit or US- some issues with insurance. She didn't look to see other covered providers.     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 Neck, shoulders sore.   Mom and mat gma with arthritis.   No RA in fam hx.       Review of Systems   Constitutional:  Positive for fatigue.   Gastrointestinal:  Positive for nausea.   Neurological:  Positive for dizziness.      Constitutional, HEENT, cardiovascular, pulmonary, gi and gu systems are negative, except as otherwise noted.      Objective    /64   Pulse 74   Temp 97.7  F (36.5  C) (Temporal)   Resp 16   Ht 1.625 m (5' 3.98\")   Wt 87.5 kg (193 lb)   SpO2 97%   BMI 33.15 kg/m    Body mass index is 33.15 kg/m .  Physical Exam   GENERAL: healthy, alert and no distress  EYES: Eyes grossly normal to inspection, PERRL and conjunctivae and sclerae normal  HENT: ear canals and TM's " normal, nose and mouth without ulcers or lesions  NECK: no adenopathy, no asymmetry, masses, or scars and thyroid normal to palpation  RESP: lungs clear to auscultation - no rales, rhonchi or wheezes  CV: regular rate and rhythm, normal S1 S2, no S3 or S4, no murmur, click or rub, no peripheral edema and peripheral pulses strong  ABDOMEN: soft, nontender, no hepatosplenomegaly, no masses and bowel sounds normal  MS: no gross musculoskeletal defects noted, no edema  NEURO: Normal strength and tone, mentation intact and speech normal  PSYCH: mentation appears normal, affect normal/bright    Results for orders placed or performed in visit on 01/03/24   Comprehensive metabolic panel     Status: Abnormal   Result Value Ref Range    Sodium 141 135 - 145 mmol/L    Potassium 4.3 3.4 - 5.3 mmol/L    Carbon Dioxide (CO2) 28 22 - 29 mmol/L    Anion Gap 10 7 - 15 mmol/L    Urea Nitrogen 14.9 6.0 - 20.0 mg/dL    Creatinine 0.61 0.51 - 0.95 mg/dL    GFR Estimate >90 >60 mL/min/1.73m2    Calcium 9.6 8.6 - 10.0 mg/dL    Chloride 103 98 - 107 mmol/L    Glucose 98 70 - 99 mg/dL    Alkaline Phosphatase 64 40 - 150 U/L    AST 71 (H) 0 - 45 U/L     (H) 0 - 50 U/L    Protein Total 7.2 6.4 - 8.3 g/dL    Albumin 4.4 3.5 - 5.2 g/dL    Bilirubin Total 0.4 <=1.2 mg/dL   CBC with platelets     Status: Normal   Result Value Ref Range    WBC Count 5.5 4.0 - 11.0 10e3/uL    RBC Count 4.58 3.80 - 5.20 10e6/uL    Hemoglobin 14.1 11.7 - 15.7 g/dL    Hematocrit 41.8 35.0 - 47.0 %    MCV 91 78 - 100 fL    MCH 30.8 26.5 - 33.0 pg    MCHC 33.7 31.5 - 36.5 g/dL    RDW 11.9 10.0 - 15.0 %    Platelet Count 262 150 - 450 10e3/uL   Iron & Iron Binding Capacity     Status: Normal   Result Value Ref Range    Iron 131 37 - 145 ug/dL    Iron Binding Capacity 333 240 - 430 ug/dL    Iron Sat Index 39 15 - 46 %   Ferritin     Status: Normal   Result Value Ref Range    Ferritin 261 11 - 328 ng/mL   TSH with free T4 reflex     Status: Normal   Result Value Ref  Range    TSH 1.18 0.30 - 4.20 uIU/mL

## 2024-01-03 NOTE — PATIENT INSTRUCTIONS
Avoid NSAID medications- ibuprofen, naproxen, aleve, motrin, advil, etc....   Acetaminophen (tylenol) 1000mg every 8 hours (3 x daily) for arthritis    Labs today   Please get scheduled with endocrine for monitoring/follow up on the thyroid.

## 2024-01-05 ENCOUNTER — MYC MEDICAL ADVICE (OUTPATIENT)
Dept: FAMILY MEDICINE | Facility: CLINIC | Age: 54
End: 2024-01-05
Payer: COMMERCIAL

## 2024-01-08 ENCOUNTER — ANCILLARY PROCEDURE (OUTPATIENT)
Dept: ULTRASOUND IMAGING | Facility: CLINIC | Age: 54
End: 2024-01-08
Attending: PHYSICIAN ASSISTANT
Payer: COMMERCIAL

## 2024-01-08 DIAGNOSIS — R74.8 ELEVATED LIVER ENZYMES: ICD-10-CM

## 2024-01-08 PROCEDURE — 76705 ECHO EXAM OF ABDOMEN: CPT

## 2024-01-10 ENCOUNTER — LAB (OUTPATIENT)
Dept: LAB | Facility: CLINIC | Age: 54
End: 2024-01-10
Payer: COMMERCIAL

## 2024-01-10 DIAGNOSIS — R74.8 ELEVATED LIVER ENZYMES: ICD-10-CM

## 2024-01-10 LAB
ALBUMIN SERPL BCG-MCNC: 4.5 G/DL (ref 3.5–5.2)
ALP SERPL-CCNC: 59 U/L (ref 40–150)
ALT SERPL W P-5'-P-CCNC: 63 U/L (ref 0–50)
AST SERPL W P-5'-P-CCNC: 25 U/L (ref 0–45)
BILIRUB DIRECT SERPL-MCNC: <0.2 MG/DL (ref 0–0.3)
BILIRUB SERPL-MCNC: 0.2 MG/DL
PROT SERPL-MCNC: 7.3 G/DL (ref 6.4–8.3)

## 2024-01-10 PROCEDURE — 36415 COLL VENOUS BLD VENIPUNCTURE: CPT

## 2024-01-10 PROCEDURE — 86803 HEPATITIS C AB TEST: CPT

## 2024-01-10 PROCEDURE — 86704 HEP B CORE ANTIBODY TOTAL: CPT

## 2024-01-10 PROCEDURE — 80076 HEPATIC FUNCTION PANEL: CPT

## 2024-01-10 PROCEDURE — 86709 HEPATITIS A IGM ANTIBODY: CPT

## 2024-01-10 PROCEDURE — 87340 HEPATITIS B SURFACE AG IA: CPT

## 2024-01-10 PROCEDURE — 86706 HEP B SURFACE ANTIBODY: CPT

## 2024-01-11 LAB
HAV IGM SERPL QL IA: NONREACTIVE
HBV CORE AB SERPL QL IA: NONREACTIVE
HBV SURFACE AB SERPL IA-ACNC: <3.5 M[IU]/ML
HBV SURFACE AB SERPL IA-ACNC: NONREACTIVE M[IU]/ML
HBV SURFACE AG SERPL QL IA: NONREACTIVE
HCV AB SERPL QL IA: NONREACTIVE

## 2024-01-22 ASSESSMENT — ENCOUNTER SYMPTOMS
EYE PAIN: 0
MYALGIAS: 0
HEADACHES: 0
PALPITATIONS: 0
NERVOUS/ANXIOUS: 0
HEARTBURN: 0
BREAST MASS: 0
HEMATOCHEZIA: 0
HEMATURIA: 0
FREQUENCY: 0
CONSTIPATION: 0
DIARRHEA: 0
JOINT SWELLING: 0
SORE THROAT: 0
CHILLS: 0
SHORTNESS OF BREATH: 0
DYSURIA: 0
WEAKNESS: 0
DIZZINESS: 0
FEVER: 0
ARTHRALGIAS: 1
NAUSEA: 0
PARESTHESIAS: 0
ABDOMINAL PAIN: 0
COUGH: 0

## 2024-01-29 ENCOUNTER — OFFICE VISIT (OUTPATIENT)
Dept: FAMILY MEDICINE | Facility: CLINIC | Age: 54
End: 2024-01-29
Payer: COMMERCIAL

## 2024-01-29 VITALS
OXYGEN SATURATION: 97 % | DIASTOLIC BLOOD PRESSURE: 70 MMHG | WEIGHT: 183 LBS | TEMPERATURE: 97.8 F | BODY MASS INDEX: 31.24 KG/M2 | SYSTOLIC BLOOD PRESSURE: 102 MMHG | HEIGHT: 64 IN | RESPIRATION RATE: 16 BRPM | HEART RATE: 78 BPM

## 2024-01-29 DIAGNOSIS — Z12.31 ENCOUNTER FOR SCREENING MAMMOGRAM FOR MALIGNANT NEOPLASM OF BREAST: ICD-10-CM

## 2024-01-29 DIAGNOSIS — R74.8 ELEVATED LIVER ENZYMES: ICD-10-CM

## 2024-01-29 DIAGNOSIS — Z87.898 HISTORY OF MOTION SICKNESS: ICD-10-CM

## 2024-01-29 DIAGNOSIS — Z00.00 ROUTINE GENERAL MEDICAL EXAMINATION AT A HEALTH CARE FACILITY: Primary | ICD-10-CM

## 2024-01-29 DIAGNOSIS — M25.50 POLYARTHRALGIA: ICD-10-CM

## 2024-01-29 LAB
ALBUMIN SERPL BCG-MCNC: 4.5 G/DL (ref 3.5–5.2)
ALP SERPL-CCNC: 58 U/L (ref 40–150)
ALT SERPL W P-5'-P-CCNC: 42 U/L (ref 0–50)
AST SERPL W P-5'-P-CCNC: 27 U/L (ref 0–45)
BILIRUB DIRECT SERPL-MCNC: <0.2 MG/DL (ref 0–0.3)
BILIRUB SERPL-MCNC: 0.3 MG/DL
CHOLEST SERPL-MCNC: 209 MG/DL
CRP SERPL-MCNC: <3 MG/L
ERYTHROCYTE [SEDIMENTATION RATE] IN BLOOD BY WESTERGREN METHOD: 10 MM/HR (ref 0–30)
FASTING STATUS PATIENT QL REPORTED: YES
FASTING STATUS PATIENT QL REPORTED: YES
GLUCOSE SERPL-MCNC: 101 MG/DL (ref 70–99)
HDLC SERPL-MCNC: 74 MG/DL
LDLC SERPL CALC-MCNC: 123 MG/DL
NONHDLC SERPL-MCNC: 135 MG/DL
PROT SERPL-MCNC: 7.3 G/DL (ref 6.4–8.3)
RHEUMATOID FACT SERPL-ACNC: <10 IU/ML
TRIGL SERPL-MCNC: 59 MG/DL

## 2024-01-29 PROCEDURE — 80076 HEPATIC FUNCTION PANEL: CPT | Performed by: PHYSICIAN ASSISTANT

## 2024-01-29 PROCEDURE — 82947 ASSAY GLUCOSE BLOOD QUANT: CPT | Performed by: PHYSICIAN ASSISTANT

## 2024-01-29 PROCEDURE — 85652 RBC SED RATE AUTOMATED: CPT | Performed by: PHYSICIAN ASSISTANT

## 2024-01-29 PROCEDURE — 36415 COLL VENOUS BLD VENIPUNCTURE: CPT | Performed by: PHYSICIAN ASSISTANT

## 2024-01-29 PROCEDURE — 86431 RHEUMATOID FACTOR QUANT: CPT | Performed by: PHYSICIAN ASSISTANT

## 2024-01-29 PROCEDURE — 86140 C-REACTIVE PROTEIN: CPT | Performed by: PHYSICIAN ASSISTANT

## 2024-01-29 PROCEDURE — 99214 OFFICE O/P EST MOD 30 MIN: CPT | Mod: 25 | Performed by: PHYSICIAN ASSISTANT

## 2024-01-29 PROCEDURE — 99396 PREV VISIT EST AGE 40-64: CPT | Performed by: PHYSICIAN ASSISTANT

## 2024-01-29 PROCEDURE — 80061 LIPID PANEL: CPT | Performed by: PHYSICIAN ASSISTANT

## 2024-01-29 RX ORDER — SCOLOPAMINE TRANSDERMAL SYSTEM 1 MG/1
1 PATCH, EXTENDED RELEASE TRANSDERMAL
Qty: 4 PATCH | Refills: 0 | Status: SHIPPED | OUTPATIENT
Start: 2024-01-29 | End: 2024-06-20

## 2024-01-29 RX ORDER — GABAPENTIN 300 MG/1
300 CAPSULE ORAL AT BEDTIME
Qty: 30 CAPSULE | Refills: 2 | Status: SHIPPED | OUTPATIENT
Start: 2024-01-29 | End: 2024-05-13

## 2024-01-29 ASSESSMENT — ENCOUNTER SYMPTOMS
EYE PAIN: 0
DYSURIA: 0
NAUSEA: 0
COUGH: 0
JOINT SWELLING: 0
FEVER: 0
SHORTNESS OF BREATH: 0
DIARRHEA: 0
CONSTIPATION: 0
DIZZINESS: 0
FREQUENCY: 0
SORE THROAT: 0
ABDOMINAL PAIN: 0
CHILLS: 0
HEMATURIA: 0
PALPITATIONS: 0
MYALGIAS: 0
ARTHRALGIAS: 1
HEADACHES: 0
WEAKNESS: 0
NERVOUS/ANXIOUS: 0

## 2024-01-29 ASSESSMENT — PAIN SCALES - GENERAL: PAINLEVEL: NO PAIN (0)

## 2024-01-29 NOTE — PROGRESS NOTES
Preventive Care Visit  [facility] RAUL Montez PA-C, Family Medicine  2024       SUBJECTIVE:   Monique is a 53 year old, presenting for the following:  Physical        2024     9:35 AM   Additional Questions   Roomed by Lexie WHITE CMA   Accompanied by Self     Healthy Habits:     Getting at least 3 servings of Calcium per day:  Yes    Bi-annual eye exam:  Yes    Dental care twice a year:  Yes    Sleep apnea or symptoms of sleep apnea:  None    Diet:  Regular (no restrictions)    Frequency of exercise:  1 day/week    Duration of exercise:  15-30 minutes    Taking medications regularly:  Yes    Medication side effects:  None    Additional concerns today:  No    Routine Health Maintenance:  Immunizations - declines covid booster. She will do influenza vaccine later this week.   Mammogram: 2023  Colonoscopy: 2023- repeat in 5 yr if mom had advanced adenomatous polyps.     Fasting: yes  Lipids screenin yr ago- LDL 97,    Diabetes screenin  yr ago - glucose 101. Fam hx mom    GYN Hx: s/p hyst- pap no longer indicated   - due to fibroids and heavy periods   Menopause: no bleeding, no concerns   Sexually active/STD screening concerns: yes, no new partners  Denies GYN concerns of PCB, pelvic pain, dyspareunia, vaginal discharge    Was seen 4 weeks ago for fatigue, dizziness, nausea.   She felt better with increasing dietary protein and iron.   Labs returned with elevated liver enzymes.   US abd- 24  IMPRESSION:      1. Hepatomegaly with diffuse intrinsic hepatic parenchymal disease in  a pattern most compatible with diffuse fatty infiltration. Probable  focal area of fatty sparing adjacent the gallbladder fossa.  2. Multiple liver cysts    Has been following a diet that is focused on protein and vegetables, cutting out carbs and extra sugars. Called [employer]- on an kanu.   Has lost 6 lb on her home scale in 4 weeks.   She has cut out all alcohol. She was  drinking 1-2 drinks per night.     Going on a cruise in a few weeks. Is prone to motion sickness. Requesting rx for scopolamine patches. Has used before. Tolerated it well when used it previously.     Polyarthralgias-   Hands, neck, knees, toes.   Was taking frequent NSAIDs.   When it got cold sx were worse.   Fam hx of OA. No fam hx of RA.   She did use gabapentin for a bout a year after a back surgery and tolerated it well.       Social History     Tobacco Use    Smoking status: Former     Packs/day: 1.00     Years: 10.00     Additional pack years: 0.00     Total pack years: 10.00     Types: Cigarettes     Quit date: 5/15/1997     Years since quittin.7    Smokeless tobacco: Never   Substance Use Topics    Alcohol use: Yes     Alcohol/week: 3.0 standard drinks of alcohol     Types: 3 Standard drinks or equivalent per week     Comment: A lillte / occasional - 3-4 per week             2024     8:54 AM   Alcohol Use   Prescreen: >3 drinks/day or >7 drinks/week? No          No data to display              Reviewed orders with patient.  Reviewed health maintenance and updated orders accordingly - Yes  Lab work is in process  Labs reviewed in EPIC  BP Readings from Last 3 Encounters:   24 102/70   24 124/64   23 117/65    Wt Readings from Last 3 Encounters:   24 83 kg (183 lb)   24 87.5 kg (193 lb)   22 87.2 kg (192 lb 4.8 oz)                  Patient Active Problem List   Diagnosis    Family history of thyroid cancer    Family history of thyroid disease    CARDIOVASCULAR SCREENING; LDL GOAL LESS THAN 160    Irritable bowel syndrome with diarrhea    DDD (degenerative disc disease), lumbar    Spondylolisthesis, lumbar region    Pap smear of cervix not needed    H/O hysterectomy for benign disease    Papillary thyroid carcinoma (H)     Past Surgical History:   Procedure Laterality Date    BIOPSY  5/15 &     Both on thyroid    COLONOSCOPY N/A 2023    Procedure:  COLONOSCOPY, WITH POLYPECTOMY AND BIOPSY;  Surgeon: Griselda Yepez MD;  Location: [facility] OR    COLONOSCOPY WITH CO2 INSUFFLATION N/A 2023    Procedure: COLONOSCOPY, WITH CO2 INSUFFLATION;  Surgeon: Griselda Yepez MD;  Location: [facility] OR    FUSION LUMBAR ANTERIOR ONE LEVEL N/A 2016    Procedure: FUSION LUMBAR ANTERIOR ONE LEVEL;  Surgeon: Kit Barrera MD;  Location: [facility] OR    GYN SURGERY  2008    Hysterectomy    HEAD & NECK SURGERY  3/11/16    Partial thyroid lobectomy, left lobe    HYSTERECTOMY  2008    heavy menses, fibroids    HYSTERECTOMY, PAP NO LONGER INDICATED      OPTICAL TRACKING SYSTEM FUSION SPINE POSTERIOR LUMBAR PERCUTANEOUS ONE LEVEL N/A 2016    Procedure: OPTICAL TRACKING SYSTEM FUSION SPINE POSTERIOR LUMBAR PERCUTANEOUS ONE LEVEL;  Surgeon: Kit Barrera MD;  Location: [facility] OR       Social History     Tobacco Use    Smoking status: Former     Packs/day: 1.00     Years: 10.00     Additional pack years: 0.00     Total pack years: 10.00     Types: Cigarettes     Quit date: 5/15/1997     Years since quittin.7    Smokeless tobacco: Never   Substance Use Topics    Alcohol use: Yes     Alcohol/week: 3.0 standard drinks of alcohol     Types: 3 Standard drinks or equivalent per week     Comment: A lillte / occasional - 3-4 per week     Family History   Problem Relation Age of Onset    Diabetes Mother     Thyroid Disease Mother         goiter     Coronary Artery Disease Mother 68    Hypertension Father     Thyroid Disease Maternal Grandmother         Goiter     Thyroid Disease Daughter         thyrpoi CA, Dx 2014    Other Cancer Daughter     Thyroid Disease Daughter     Thyroid Disease Maternal Aunt         Thyroid CA,  -     Other Cancer Maternal Aunt     Other Cancer Cousin     Thyroid Disease Cousin         thyroid CA,     Other Cancer Other     Hyperlipidemia No family hx of     Breast Cancer No family hx of     Cancer - colorectal No family hx  of     Ovarian Cancer No family hx of     Prostate Cancer No family hx of     Cerebrovascular Disease No family hx of     Asthma No family hx of     Known Genetic Syndrome No family hx of     Colon Cancer No family hx of     Anxiety Disorder No family hx of     Substance Abuse No family hx of     Anesthesia Reaction No family hx of          Current Outpatient Medications   Medication Sig Dispense Refill    acetaminophen (TYLENOL) 500 MG tablet Take 500-1,000 mg by mouth every 6 hours as needed for mild pain      dicyclomine (BENTYL) 10 MG capsule Take 1 capsule (10 mg) by mouth 4 times daily as needed (for IBS symptoms) 30 capsule 0    gabapentin (NEURONTIN) 300 MG capsule Take 1 capsule (300 mg) by mouth at bedtime 30 capsule 2    ibuprofen (ADVIL/MOTRIN) 200 MG tablet Take 200 mg by mouth every 4 hours as needed for pain      scopolamine (TRANSDERM) 1 MG/3DAYS 72 hr patch Place 1 patch onto the skin every 72 hours 4 patch 0     No Known Allergies    Breast Cancer Screenin/11/2022    10:25 AM   Breast CA Risk Assessment (FHS-7)   Do you have a family history of breast, colon, or ovarian cancer? No / Unknown         Mammogram Screening: Recommended annual mammography  Pertinent mammograms are reviewed under the imaging tab.    History of abnormal Pap smear: Status post benign hysterectomy. Health Maintenance and Surgical History updated.     Reviewed and updated as needed this visit by clinical staff   Tobacco  Allergies  Meds              Reviewed and updated as needed this visit by Provider                    Review of Systems   Constitutional:  Negative for chills and fever.   HENT:  Negative for congestion, ear pain, hearing loss and sore throat.    Eyes:  Negative for pain and visual disturbance.   Respiratory:  Negative for cough and shortness of breath.    Cardiovascular:  Negative for chest pain and palpitations.   Gastrointestinal:  Negative for abdominal pain, constipation, diarrhea and  "nausea.   Genitourinary:  Negative for dysuria, frequency, genital sores, hematuria, pelvic pain, urgency, vaginal bleeding and vaginal discharge.   Musculoskeletal:  Positive for arthralgias. Negative for joint swelling and myalgias.   Skin:  Negative for rash.   Neurological:  Negative for dizziness, weakness and headaches.   Psychiatric/Behavioral:  The patient is not nervous/anxious.          OBJECTIVE:   /70   Pulse 78   Temp 97.8  F (36.6  C) (Temporal)   Resp 16   Ht 1.626 m (5' 4\")   Wt 83 kg (183 lb)   SpO2 97%   BMI 31.41 kg/m     Estimated body mass index is 31.41 kg/m  as calculated from the following:    Height as of this encounter: 1.626 m (5' 4\").    Weight as of this encounter: 83 kg (183 lb).  Physical Exam  GENERAL: alert and no distress  EYES: Eyes grossly normal to inspection, PERRL and conjunctivae and sclerae normal  HENT: ear canals and TM's normal, nose and mouth without ulcers or lesions  NECK: no adenopathy, no asymmetry, masses, or scars  RESP: lungs clear to auscultation - no rales, rhonchi or wheezes  BREAST: normal without masses, tenderness or nipple discharge and no palpable axillary masses or adenopathy  CV: regular rate and rhythm, normal S1 S2, no S3 or S4, no murmur, click or rub, no peripheral edema  ABDOMEN: soft, nontender, no hepatosplenomegaly, no masses and bowel sounds normal  MS: no gross musculoskeletal defects noted, no edema  SKIN: no suspicious lesions or rashes  NEURO: Normal strength and tone, mentation intact and speech normal  PSYCH: mentation appears normal, affect normal/bright  LYMPH: no cervical, supraclavicular, axillary, or inguinal adenopathy    Diagnostic Test Results:  Labs reviewed in Epic  Results for orders placed or performed in visit on 01/29/24   ESR: Erythrocyte sedimentation rate     Status: Normal   Result Value Ref Range    Erythrocyte Sedimentation Rate 10 0 - 30 mm/hr       ASSESSMENT/PLAN:   Routine general medical examination at " a health care facility  Reviewed VS, weight/BMI with pt   Immunizations: see discussion in HPI above or orders. Discussed some vaccines covered through the pharmacy benefit (ie Shingrix, RSV, Tdap)  Counseling see below   Health screenings/maintenance per orders below  Counseled on appropriate cancer screenings   Heart healthy exercise and eating habits discussed  - REVIEW OF HEALTH MAINTENANCE PROTOCOL ORDERS  - PRIMARY CARE FOLLOW-UP SCHEDULING; Future  - Glucose; Future  - Lipid panel reflex to direct LDL Fasting; Future  - Glucose  - Lipid panel reflex to direct LDL Fasting    History of motion sickness  Discussed risks vs benefits, possible side effects, how to use   - scopolamine (TRANSDERM) 1 MG/3DAYS 72 hr patch; Place 1 patch onto the skin every 72 hours    Elevated liver enzymes  Recheck liver enzymes.   She has discontinued all supplements  She has discontinued all alcohol  She is working on a healthy diet- protein, vegetables, fruits, limited carbs and has lost 6-8 lbs.   Reviewed US   Repeat hepatic panel today. If normal will repeat in 3 mo.   - Hepatic panel (Albumin, ALT, AST, Bili, Alk Phos, TP); Future  - Hepatic panel (Albumin, ALT, AST, Bili, Alk Phos, TP)    Polyarthralgia  Discussed likely OA.   Her job as a [employer] is a contributing factor that affects hands, shoulders, feet etc.   Limiting NSAIDs due to prior gastritis.   Limiting acetaminophen due to transaminitis.   Will try gabapentin at bedtime for pain.   - Rheumatoid factor; Future  - ESR: Erythrocyte sedimentation rate; Future  - CRP, inflammation; Future  - gabapentin (NEURONTIN) 300 MG capsule; Take 1 capsule (300 mg) by mouth at bedtime  - Rheumatoid factor  - ESR: Erythrocyte sedimentation rate  - CRP, inflammation    Encounter for screening mammogram for malignant neoplasm of breast  Discussed screening; advised and ordered mammogram. Pt given info to schedule.   - *MA Screening Digital Bilateral; Future    Patient has been  advised of split billing requirements and indicates understanding: Yes      Counseling  Reviewed preventive health counseling, as reflected in patient instructions       Regular exercise       Healthy diet/nutrition        She reports that she quit smoking about 26 years ago. Her smoking use included cigarettes. She has a 10 pack-year smoking history. She has never used smokeless tobacco.          Signed Electronically by: Tomasa Montez PA-C

## 2024-02-29 ENCOUNTER — E-VISIT (OUTPATIENT)
Dept: FAMILY MEDICINE | Facility: CLINIC | Age: 54
End: 2024-02-29
Payer: COMMERCIAL

## 2024-02-29 DIAGNOSIS — L03.039 PARONYCHIA OF TOE, UNSPECIFIED LATERALITY: Primary | ICD-10-CM

## 2024-02-29 PROCEDURE — 99207 PR NON-BILLABLE SERV PER CHARTING: CPT | Performed by: PHYSICIAN ASSISTANT

## 2024-04-16 ENCOUNTER — MYC MEDICAL ADVICE (OUTPATIENT)
Dept: FAMILY MEDICINE | Facility: CLINIC | Age: 54
End: 2024-04-16
Payer: COMMERCIAL

## 2024-04-16 NOTE — TELEPHONE ENCOUNTER
To provider to advise on mychart request for another round of antibiotics.  Did attach a picture; has no insurance til May 1.  See [employer] message. ? Will she need appointment or virtual Evisit?   Last OV: 1/29/24 with PCP for physical.   Pharmacy: [facility] [employer] Jun ENCARNACION RN

## 2024-04-16 NOTE — TELEPHONE ENCOUNTER
Chronic issue, needs podiatry to eval for ingrown toenail treatment- generally partial removal is indicated- can be done at later date if redness has remained the same. If thinking is acutely worsening, needs visit for antibiotics- virtual OK.   For KP who is out of office.   HASMUKH Viveros CNP

## 2024-04-16 NOTE — TELEPHONE ENCOUNTER
RN called with message below  Chronic issue, needs podiatry to eval for ingrown toenail treatment- generally partial removal is indicated- can be done at later date if redness has remained the same. If thinking is acutely worsening, needs visit for antibiotics- virtual OK.   For KP who is out of office.   Jaylene Henry, APRN CNP    Patient stated she has no insurance and just wants antibiotics. Reviewed note as above and gave customer service billing number upon request for visit fees.     Patient verbalized understanding and all questions answered.      Ana Collazo RN  [facility] - Registered Nurse  Clinic Triage [place]   April 16, 2024

## 2024-05-13 ENCOUNTER — MYC REFILL (OUTPATIENT)
Dept: FAMILY MEDICINE | Facility: CLINIC | Age: 54
End: 2024-05-13
Payer: COMMERCIAL

## 2024-05-13 DIAGNOSIS — M25.50 POLYARTHRALGIA: ICD-10-CM

## 2024-05-15 RX ORDER — GABAPENTIN 300 MG/1
300 CAPSULE ORAL AT BEDTIME
Qty: 90 CAPSULE | Refills: 0 | Status: SHIPPED | OUTPATIENT
Start: 2024-05-15 | End: 2024-09-16

## 2024-05-27 ENCOUNTER — MYC REFILL (OUTPATIENT)
Dept: FAMILY MEDICINE | Facility: CLINIC | Age: 54
End: 2024-05-27
Payer: COMMERCIAL

## 2024-05-27 DIAGNOSIS — K58.0 IRRITABLE BOWEL SYNDROME WITH DIARRHEA: ICD-10-CM

## 2024-05-29 NOTE — TELEPHONE ENCOUNTER
Caprice Buckley, RN   to [facility] - Primary Care       5/28/24 12:25 PM  Last prescribed in 2022. Please triage/confirm reason for gap in medication, then route to PCP for approval

## 2024-05-29 NOTE — TELEPHONE ENCOUNTER
RN called patient and she states that she takes Bentyl for her IBS as she gets abdominal discomfort off and on. She states TORI, PA offered refill at time of appt on  but patient didn't think she needed more as she knew she still had some pills left. However, patient realized her pills have  and needs new prescription. Please review and fill as appropriate.

## 2024-05-30 RX ORDER — DICYCLOMINE HYDROCHLORIDE 10 MG/1
10 CAPSULE ORAL 4 TIMES DAILY PRN
Qty: 30 CAPSULE | Refills: 1 | Status: SHIPPED | OUTPATIENT
Start: 2024-05-30

## 2024-06-11 ENCOUNTER — OFFICE VISIT (OUTPATIENT)
Dept: PODIATRY | Facility: CLINIC | Age: 54
End: 2024-06-11
Payer: COMMERCIAL

## 2024-06-11 DIAGNOSIS — B35.1 ONYCHOMYCOSIS: Primary | ICD-10-CM

## 2024-06-11 DIAGNOSIS — L03.039 PARONYCHIA OF GREAT TOE: ICD-10-CM

## 2024-06-11 PROCEDURE — 99203 OFFICE O/P NEW LOW 30 MIN: CPT | Performed by: PODIATRIST

## 2024-06-11 RX ORDER — ECONAZOLE NITRATE 10 MG/G
CREAM TOPICAL DAILY
Qty: 85 G | Refills: 5 | Status: SHIPPED | OUTPATIENT
Start: 2024-06-11

## 2024-06-11 RX ORDER — CEPHALEXIN 500 MG/1
500 CAPSULE ORAL 2 TIMES DAILY
Qty: 14 CAPSULE | Refills: 0 | Status: SHIPPED | OUTPATIENT
Start: 2024-06-11 | End: 2024-06-20

## 2024-06-11 NOTE — LETTER
6/11/2024      Cuca Tyler  [address] [place] [address]      Dear Colleague,    Thank you for referring your patient, Cuca Tyler, to the [facility]. Please see a copy of my visit note below.    Past Medical History:   Diagnosis Date     Cancer (H)     Thyroid, my aunt & daughter     Congestive heart failure (H)     Mom     DDD (degenerative disc disease), lumbar 10/31/2016     Diabetes (H)     Mom     H/O hysterectomy for benign disease      Motion sickness      Pap smear of cervix not needed      PONV (postoperative nausea and vomiting)      Thyroid nodule 05/26/2015     Patient Active Problem List   Diagnosis     Family history of thyroid cancer     Family history of thyroid disease     CARDIOVASCULAR SCREENING; LDL GOAL LESS THAN 160     Irritable bowel syndrome with diarrhea     DDD (degenerative disc disease), lumbar     Spondylolisthesis, lumbar region     Pap smear of cervix not needed     H/O hysterectomy for benign disease     Papillary thyroid carcinoma (H)     Past Surgical History:   Procedure Laterality Date     BIOPSY  5/15 & 2/16    Both on thyroid     COLONOSCOPY N/A 2/13/2023    Procedure: COLONOSCOPY, WITH POLYPECTOMY AND BIOPSY;  Surgeon: Griselda Yepez MD;  Location: [facility]     COLONOSCOPY WITH CO2 INSUFFLATION N/A 2/13/2023    Procedure: COLONOSCOPY, WITH CO2 INSUFFLATION;  Surgeon: Griselda Yepez MD;  Location: [facility]     FUSION LUMBAR ANTERIOR ONE LEVEL N/A 11/9/2016    Procedure: FUSION LUMBAR ANTERIOR ONE LEVEL;  Surgeon: Kit Barrera MD;  Location: [facility]     GYN SURGERY  8/2008    Hysterectomy     HEAD & NECK SURGERY  3/11/16    Partial thyroid lobectomy, left lobe     HYSTERECTOMY  8/2008    heavy menses, fibroids     HYSTERECTOMY, PAP NO LONGER INDICATED  2008     OPTICAL TRACKING SYSTEM FUSION SPINE POSTERIOR LUMBAR PERCUTANEOUS ONE LEVEL N/A 11/9/2016    Procedure: OPTICAL TRACKING SYSTEM FUSION SPINE POSTERIOR LUMBAR  PERCUTANEOUS ONE LEVEL;  Surgeon: Kit Barrera MD;  Location: [facility] OR     Social History     Socioeconomic History     Marital status:      Spouse name: Not on file     Number of children: Not on file     Years of education: Not on file     Highest education level: Not on file   Occupational History     Not on file   Tobacco Use     Smoking status: Former     Current packs/day: 0.00     Average packs/day: 1 pack/day for 10.0 years (10.0 ttl pk-yrs)     Types: Cigarettes     Start date: 5/15/1987     Quit date: 5/15/1997     Years since quittin.0     Smokeless tobacco: Never   Vaping Use     Vaping status: Never Used   Substance and Sexual Activity     Alcohol use: Yes     Alcohol/week: 3.0 standard drinks of alcohol     Types: 3 Standard drinks or equivalent per week     Comment: A lillte / occasional - 3-4 per week     Drug use: No     Sexual activity: Yes     Partners: Male     Birth control/protection: Female Surgical     Comment:  one partner; hysterectomy   Other Topics Concern     Parent/sibling w/ CABG, MI or angioplasty before 65F 55M? No   Social History Narrative     Not on file     Social Determinants of Health     Financial Resource Strain: Low Risk  (2023)    Financial Resource Strain      Within the past 12 months, have you or your family members you live with been unable to get utilities (heat, electricity) when it was really needed?: No   Food Insecurity: Low Risk  (2023)    Food Insecurity      Within the past 12 months, did you worry that your food would run out before you got money to buy more?: No      Within the past 12 months, did the food you bought just not last and you didn t have money to get more?: No   Transportation Needs: Low Risk  (2023)    Transportation Needs      Within the past 12 months, has lack of transportation kept you from medical appointments, getting your medicines, non-medical meetings or appointments, work, or from  getting things that you need?: No   Physical Activity: Not on file   Stress: Not on file   Social Connections: Unknown (1/1/2022)    Received from [facility], [facility]    Social Connections      Frequency of Communication with Friends and Family: Not on file   Interpersonal Safety: Low Risk  (1/29/2024)    Interpersonal Safety      Do you feel physically and emotionally safe where you currently live?: Yes      Within the past 12 months, have you been hit, slapped, kicked or otherwise physically hurt by someone?: No      Within the past 12 months, have you been humiliated or emotionally abused in other ways by your partner or ex-partner?: No   Housing Stability: Low Risk  (12/28/2023)    Housing Stability      Do you have housing? : Yes      Are you worried about losing your housing?: No     Family History   Problem Relation Age of Onset     Diabetes Mother      Thyroid Disease Mother         goiter      Coronary Artery Disease Mother 68     Hypertension Father      Thyroid Disease Maternal Grandmother         Goiter      Thyroid Disease Daughter         thyrpoi CA, Dx 2/2014     Other Cancer Daughter      Thyroid Disease Daughter      Thyroid Disease Maternal Aunt         Thyroid CA, 2013 - 2014     Other Cancer Maternal Aunt      Other Cancer Cousin      Thyroid Disease Cousin         thyroid CA,      Other Cancer Other      Hyperlipidemia No family hx of      Breast Cancer No family hx of      Cancer - colorectal No family hx of      Ovarian Cancer No family hx of      Prostate Cancer No family hx of      Cerebrovascular Disease No family hx of      Asthma No family hx of      Known Genetic Syndrome No family hx of      Colon Cancer No family hx of      Anxiety Disorder No family hx of      Substance Abuse No family hx of      Anesthesia Reaction No family hx of            Subjective findings- 54-year-old presents for ingrown lateral Hallux nail  borders bilaterally.  Relates in February she had a right lateral hallux nail border that was ingrown and infected she went to urgent care and had antibiotics and it got better but it still hurts.  Relates left lateral hallux nail borders been bothering her for about 1 and half months with no specific treatment.  Relates if she bumps them they hurt, relates has been trying to let him go out to see if they get better.    Objective findings- DP and PT are 2 out of 4 bilaterally.  Has a right lateral Hallux nail border is incurvated with mild edema, no erythema, no drainage, no odor, no calor, pain on palpation, distal and lateral dystrophy thickening and subungual debris and brittleness.  Has a left lateral Hallux nail border that is incurvated with distal erythema, edema, pain on palpation, no odor, no calor, no drainage.     Assessment and plan- Paronychia right and left lateral Hallux nail borders with infection on the left  And Onychomycosis changes on the right.  Diagnosis and treatment options discussed with the patient.  The right and left lateral hallux nail borders were debrided and local wound care with Bacitracin and Band-Aid done upon consent today and use discussed with the patient.  Advised her on foot soaks.  Prescription for econazole cream and Keflex given and use discussed with the patient.  Discussed with her P&A procedures manage she can schedule with us if she wants this done.  Previous notes reviewed.  Return to clinic and see me as needed.                                Low to moderate level of medical decision making.      Again, thank you for allowing me to participate in the care of your patient.        Sincerely,        Cody Pedro DPM

## 2024-06-11 NOTE — NURSING NOTE
Cuca Tyler's chief complaint for this visit includes:  Chief Complaint   Patient presents with    Consult For     Ingrown toenails bilaterally. Right toe started in February and left toe started in April. Went to urgent care in February for right toe and was on antibiotics.      PCP: Tomasa Montez    Referring Provider:  Referred Self, MD  No address on file    There were no vitals taken for this visit.  Data Unavailable     Do you need any medication refills at today's visit? NO    No Known Allergies    Laura Garcia ATC

## 2024-06-11 NOTE — PROGRESS NOTES
Past Medical History:   Diagnosis Date    Cancer (H)     Thyroid, my aunt & daughter    Congestive heart failure (H)     Mom    DDD (degenerative disc disease), lumbar 10/31/2016    Diabetes (H)     Mom    H/O hysterectomy for benign disease     Motion sickness     Pap smear of cervix not needed     PONV (postoperative nausea and vomiting)     Thyroid nodule 05/26/2015     Patient Active Problem List   Diagnosis    Family history of thyroid cancer    Family history of thyroid disease    CARDIOVASCULAR SCREENING; LDL GOAL LESS THAN 160    Irritable bowel syndrome with diarrhea    DDD (degenerative disc disease), lumbar    Spondylolisthesis, lumbar region    Pap smear of cervix not needed    H/O hysterectomy for benign disease    Papillary thyroid carcinoma (H)     Past Surgical History:   Procedure Laterality Date    BIOPSY  5/15 & 2/16    Both on thyroid    COLONOSCOPY N/A 2/13/2023    Procedure: COLONOSCOPY, WITH POLYPECTOMY AND BIOPSY;  Surgeon: Griselda Yepez MD;  [facility]: [facility]    COLONOSCOPY WITH CO2 INSUFFLATION N/A 2/13/2023    Procedure: COLONOSCOPY, WITH CO2 INSUFFLATION;  Surgeon: Griselda Yepez MD;  Location: [facility]    FUSION LUMBAR ANTERIOR ONE LEVEL N/A 11/9/2016    Procedure: FUSION LUMBAR ANTERIOR ONE LEVEL;  Surgeon: Kit Barrera MD;  [facility]    GYN SURGERY  8/2008    Hysterectomy    HEAD & NECK SURGERY  3/11/16    Partial thyroid lobectomy, left lobe    HYSTERECTOMY  8/2008    heavy menses, fibroids    HYSTERECTOMY, PAP NO LONGER INDICATED  2008    OPTICAL TRACKING SYSTEM FUSION SPINE POSTERIOR LUMBAR PERCUTANEOUS ONE LEVEL N/A 11/9/2016    Procedure: OPTICAL TRACKING SYSTEM FUSION SPINE POSTERIOR LUMBAR PERCUTANEOUS ONE LEVEL;  Surgeon: Kit Barrera MD;  [facility]     Social History     Socioeconomic History    Marital status:      Spouse name: Not on file    Number of children: Not on file    Years of education: Not on file    Highest  education level: Not on file   Occupational History    Not on file   Tobacco Use    Smoking status: Former     Current packs/day: 0.00     Average packs/day: 1 pack/day for 10.0 years (10.0 ttl pk-yrs)     Types: Cigarettes     Start date: 5/15/1987     Quit date: 5/15/1997     Years since quittin.0    Smokeless tobacco: Never   Vaping Use    Vaping status: Never Used   Substance and Sexual Activity    Alcohol use: Yes     Alcohol/week: 3.0 standard drinks of alcohol     Types: 3 Standard drinks or equivalent per week     Comment: A lillte / occasional - 3-4 per week    Drug use: No    Sexual activity: Yes     Partners: Male     Birth control/protection: Female Surgical     Comment:  one partner; hysterectomy   Other Topics Concern    Parent/sibling w/ CABG, MI or angioplasty before 65F 55M? No   Social History Narrative    Not on file     Social Determinants of Health     Financial Resource Strain: Low Risk  (2023)    Financial Resource Strain     Within the past 12 months, have you or your family members you live with been unable to get utilities (heat, electricity) when it was really needed?: No   Food Insecurity: Low Risk  (2023)    Food Insecurity     Within the past 12 months, did you worry that your food would run out before you got money to buy more?: No     Within the past 12 months, did the food you bought just not last and you didn t have money to get more?: No   Transportation Needs: Low Risk  (2023)    Transportation Needs     Within the past 12 months, has lack of transportation kept you from medical appointments, getting your medicines, non-medical meetings or appointments, work, or from getting things that you need?: No   Physical Activity: Not on file   Stress: Not on file   Social Connections: Unknown (2022)    [facility], [facility]    Social Connections     Frequency of  Communication with Friends and Family: Not on file   Interpersonal Safety: Low Risk  (1/29/2024)    Interpersonal Safety     Do you feel physically and emotionally safe where you currently live?: Yes     Within the past 12 months, have you been hit, slapped, kicked or otherwise physically hurt by someone?: No     Within the past 12 months, have you been humiliated or emotionally abused in other ways by your partner or ex-partner?: No   Housing Stability: Low Risk  (12/28/2023)    Housing Stability     Do you have housing? : Yes     Are you worried about losing your housing?: No     Family History   Problem Relation Age of Onset    Diabetes Mother     Thyroid Disease Mother         goiter     Coronary Artery Disease Mother 68    Hypertension Father     Thyroid Disease Maternal Grandmother         Goiter     Thyroid Disease Daughter         thyrpoi CA, Dx 2/2014    Other Cancer Daughter     Thyroid Disease Daughter     Thyroid Disease Maternal Aunt         Thyroid CA, 2013 - 2014    Other Cancer Maternal Aunt     Other Cancer Cousin     Thyroid Disease Cousin         thyroid CA,     Other Cancer Other     Hyperlipidemia No family hx of     Breast Cancer No family hx of     Cancer - colorectal No family hx of     Ovarian Cancer No family hx of     Prostate Cancer No family hx of     Cerebrovascular Disease No family hx of     Asthma No family hx of     Known Genetic Syndrome No family hx of     Colon Cancer No family hx of     Anxiety Disorder No family hx of     Substance Abuse No family hx of     Anesthesia Reaction No family hx of            Subjective findings- 54-year-old presents for ingrown lateral Hallux nail borders bilaterally.  Relates in February she had a right lateral hallux nail border that was ingrown and infected she went to urgent care and had antibiotics and it got better but it still hurts.  Relates left lateral hallux nail borders been bothering her for about 1 and half months with no specific  treatment.  Relates if she bumps them they hurt, relates has been trying to let him go out to see if they get better.    Objective findings- DP and PT are 2 out of 4 bilaterally.  Has a right lateral Hallux nail border is incurvated with mild edema, no erythema, no drainage, no odor, no calor, pain on palpation, distal and lateral dystrophy thickening and subungual debris and brittleness.  Has a left lateral Hallux nail border that is incurvated with distal erythema, edema, pain on palpation, no odor, no calor, no drainage.     Assessment and plan- Paronychia right and left lateral Hallux nail borders with infection on the left  And Onychomycosis changes on the right.  Diagnosis and treatment options discussed with the patient.  The right and left lateral hallux nail borders were debrided and local wound care with Bacitracin and Band-Aid done upon consent today and use discussed with the patient.  Advised her on foot soaks.  Prescription for econazole cream and Keflex given and use discussed with the patient.  Discussed with her P&A procedures manage she can schedule with us if she wants this done.  Previous notes reviewed.  Return to clinic and see me as needed.                                Low to moderate level of medical decision making.

## 2024-06-20 ENCOUNTER — OFFICE VISIT (OUTPATIENT)
Dept: FAMILY MEDICINE | Facility: OTHER | Age: 54
End: 2024-06-20
Payer: COMMERCIAL

## 2024-06-20 VITALS
TEMPERATURE: 98 F | OXYGEN SATURATION: 96 % | WEIGHT: 182 LBS | HEIGHT: 64 IN | BODY MASS INDEX: 31.07 KG/M2 | DIASTOLIC BLOOD PRESSURE: 70 MMHG | HEART RATE: 78 BPM | SYSTOLIC BLOOD PRESSURE: 102 MMHG

## 2024-06-20 DIAGNOSIS — S39.012A STRAIN OF LUMBAR REGION, INITIAL ENCOUNTER: Primary | ICD-10-CM

## 2024-06-20 PROCEDURE — 99213 OFFICE O/P EST LOW 20 MIN: CPT

## 2024-06-20 RX ORDER — CYCLOBENZAPRINE HCL 10 MG
10 TABLET ORAL 3 TIMES DAILY PRN
Qty: 30 TABLET | Refills: 0 | Status: SHIPPED | OUTPATIENT
Start: 2024-06-20

## 2024-06-20 RX ORDER — NAPROXEN 500 MG/1
500 TABLET ORAL 2 TIMES DAILY WITH MEALS
Qty: 14 TABLET | Refills: 0 | Status: SHIPPED | OUTPATIENT
Start: 2024-06-20 | End: 2024-06-27

## 2024-06-20 ASSESSMENT — PAIN SCALES - GENERAL: PAINLEVEL: SEVERE PAIN (6)

## 2024-06-20 NOTE — PATIENT INSTRUCTIONS
I have prescribed high dose naproxen to help manage pain associated with your low back pain. Please do NOT take additional NSAIDs (ibuprofen/Advil or naproxen/Aleve) while taking the naproxen. This medication can be hard on your stomach so please take with food. I have also prescribed a muscle relaxer called Flexeril. You can take this medication up to 3 times per day, however, it can make your feel tired so do not drive or operate machinery while taking. You can also use over-the-counter topical pain relievers such as ICY HOT and BIOFREEZE. I have included stretches that can be done at home to help with the pain. If you would like a referral to physical therapy please let me know via [employer].

## 2024-06-20 NOTE — PROGRESS NOTES
Assessment & Plan  1. Strain of lumbar region, initial encounter  Patient is a 54 year-old female with IBS, lumbar DDD, and papillary thyroid carcinoma who is s/p lumbar fusion presenting with lumbar strain. Physical exam revealed lumbar paraspinal muscle tenderness and negative straight leg raises. Discussed conservative management, prescribed PRN flexeril and 7-day course of high dose naproxen. Discussed proper medication use and side effects. Declined referral to PT at this time, will reach out if desired. Provided back stretches to be completed at home. Patient understands and is agreeable to plan as discussed in clinic.  - naproxen (NAPROSYN) 500 MG tablet; Take 1 tablet (500 mg) by mouth 2 times daily (with meals) for 7 days  Dispense: 14 tablet; Refill: 0  - cyclobenzaprine (FLEXERIL) 10 MG tablet; Take 1 tablet (10 mg) by mouth 3 times daily as needed for muscle spasms  Dispense: 30 tablet; Refill: 0    Subjective   Monique is a 54 year old, presenting for the following health issues:  Musculoskeletal Problem        6/20/2024     4:35 PM   Additional Questions   Roomed by Vicente     History of Present Illness       Back Pain:  She presents for follow up of back pain. Patient's back pain is a recurring problem.  Location of back pain:  Right lower back  Description of back pain: cramping, dull ache and sharp  Back pain spreads: right thigh    Since patient first noticed back pain, pain is: unchanged  Does back pain interfere with her job:  Yes       She eats 4 or more servings of fruits and vegetables daily.She consumes 0 sweetened beverage(s) daily.She exercises with enough effort to increase her heart rate 9 or less minutes per day.  She exercises with enough effort to increase her heart rate 3 or less days per week.   She is taking medications regularly.     Chronic/Recurring Back Pain Follow Up    Since your last clinic visit for back pain, how has your pain changed? always present, but gets better and  "worse  Does your back pain interfere with your job? YES  Since your last visit, have you tried any new treatment? Yes -  acetaminophen (Tylenol), cold, heat, massage, NSAIDs (Ibuprofen, Naproxen), and stretching    Presents with concerns of exacerbation of right lower back pain. Has occurred intermittently over the past few yeas usually with a specific trigger. Last episode was roughly 4 years ago. Denies accident, injury, or trauma to provoke pain. Started yesterday. Described as a muscle spam/cramp in right low back. Has been applying ice/heat and acetaminophen/ibuprofen without symptom relief.     Denies red flag symptoms of cauda equina syndrome including loss of bowel/bladder control or saddle paresthesia. Denies numbness, tingling, or diminished sensation in lower extremities.       Objective    /70   Pulse 78   Temp 98  F (36.7  C) (Temporal)   Ht 1.626 m (5' 4\")   Wt 82.6 kg (182 lb)   SpO2 96%   BMI 31.24 kg/m    Body mass index is 31.24 kg/m .  Physical Exam  Vitals reviewed.   Constitutional:       General: She is not in acute distress.     Appearance: Normal appearance. She is not ill-appearing.   HENT:      Head: Normocephalic and atraumatic.   Eyes:      Pupils: Pupils are equal, round, and reactive to light.   Cardiovascular:      Rate and Rhythm: Normal rate and regular rhythm.      Heart sounds: Normal heart sounds, S1 normal and S2 normal. No murmur heard.  Pulmonary:      Effort: Pulmonary effort is normal.      Breath sounds: Normal breath sounds.      Comments: Negative for adventitious breath sounds in all lung fields.  Musculoskeletal:      Cervical back: Normal, full passive range of motion without pain, normal range of motion and neck supple.      Thoracic back: Normal.      Lumbar back: Tenderness (right lumbar paraspinal muscles) present. Normal range of motion. Negative right straight leg raise test and negative left straight leg raise test.   Lymphadenopathy:      Cervical: " No cervical adenopathy.   Skin:     General: Skin is warm and dry.      Capillary Refill: Capillary refill takes less than 2 seconds.      Comments: No suspicious lesions or rashes.   Neurological:      Mental Status: She is alert.      Sensory: Sensation is intact. No sensory deficit.      Motor: Motor function is intact. No weakness.   Psychiatric:         Attention and Perception: Attention and perception normal.         Mood and Affect: Mood and affect normal.          Signed Electronically by: HASMUKH Mead CNP

## 2024-09-16 ENCOUNTER — MYC REFILL (OUTPATIENT)
Dept: FAMILY MEDICINE | Facility: CLINIC | Age: 54
End: 2024-09-16
Payer: COMMERCIAL

## 2024-09-16 DIAGNOSIS — M25.50 POLYARTHRALGIA: ICD-10-CM

## 2024-09-16 RX ORDER — GABAPENTIN 300 MG/1
300 CAPSULE ORAL AT BEDTIME
Qty: 90 CAPSULE | Refills: 0 | Status: SHIPPED | OUTPATIENT
Start: 2024-09-16

## 2025-02-05 SDOH — HEALTH STABILITY: PHYSICAL HEALTH: ON AVERAGE, HOW MANY DAYS PER WEEK DO YOU ENGAGE IN MODERATE TO STRENUOUS EXERCISE (LIKE A BRISK WALK)?: 1 DAY

## 2025-02-05 SDOH — HEALTH STABILITY: PHYSICAL HEALTH: ON AVERAGE, HOW MANY MINUTES DO YOU ENGAGE IN EXERCISE AT THIS LEVEL?: 10 MIN

## 2025-02-05 ASSESSMENT — SOCIAL DETERMINANTS OF HEALTH (SDOH): HOW OFTEN DO YOU GET TOGETHER WITH FRIENDS OR RELATIVES?: ONCE A WEEK

## 2025-02-10 ENCOUNTER — OFFICE VISIT (OUTPATIENT)
Dept: FAMILY MEDICINE | Facility: CLINIC | Age: 55
End: 2025-02-10
Attending: PHYSICIAN ASSISTANT
Payer: COMMERCIAL

## 2025-02-10 VITALS
BODY MASS INDEX: 31.8 KG/M2 | HEIGHT: 64 IN | SYSTOLIC BLOOD PRESSURE: 110 MMHG | RESPIRATION RATE: 12 BRPM | OXYGEN SATURATION: 96 % | DIASTOLIC BLOOD PRESSURE: 78 MMHG | HEART RATE: 87 BPM | WEIGHT: 186.3 LBS | TEMPERATURE: 96.9 F

## 2025-02-10 DIAGNOSIS — S39.012A STRAIN OF LUMBAR REGION, INITIAL ENCOUNTER: ICD-10-CM

## 2025-02-10 DIAGNOSIS — K58.0 IRRITABLE BOWEL SYNDROME WITH DIARRHEA: ICD-10-CM

## 2025-02-10 DIAGNOSIS — Z00.00 ROUTINE GENERAL MEDICAL EXAMINATION AT A HEALTH CARE FACILITY: Primary | ICD-10-CM

## 2025-02-10 DIAGNOSIS — R91.1 PULMONARY NODULE, RIGHT: ICD-10-CM

## 2025-02-10 DIAGNOSIS — K76.0 HEPATIC STEATOSIS: ICD-10-CM

## 2025-02-10 DIAGNOSIS — Z85.850 HX OF PAPILLARY THYROID CARCINOMA: ICD-10-CM

## 2025-02-10 DIAGNOSIS — M25.50 POLYARTHRALGIA: ICD-10-CM

## 2025-02-10 DIAGNOSIS — Z12.31 ENCOUNTER FOR SCREENING MAMMOGRAM FOR MALIGNANT NEOPLASM OF BREAST: ICD-10-CM

## 2025-02-10 LAB
ALBUMIN SERPL BCG-MCNC: 4.4 G/DL (ref 3.5–5.2)
ALP SERPL-CCNC: 57 U/L (ref 40–150)
ALT SERPL W P-5'-P-CCNC: 39 U/L (ref 0–50)
ANION GAP SERPL CALCULATED.3IONS-SCNC: 12 MMOL/L (ref 7–15)
AST SERPL W P-5'-P-CCNC: 25 U/L (ref 0–45)
BILIRUB SERPL-MCNC: 0.2 MG/DL
BUN SERPL-MCNC: 13.8 MG/DL (ref 6–20)
CALCIUM SERPL-MCNC: 9.3 MG/DL (ref 8.8–10.4)
CHLORIDE SERPL-SCNC: 107 MMOL/L (ref 98–107)
CHOLEST SERPL-MCNC: 224 MG/DL
CREAT SERPL-MCNC: 0.62 MG/DL (ref 0.51–0.95)
EGFRCR SERPLBLD CKD-EPI 2021: >90 ML/MIN/1.73M2
ERYTHROCYTE [DISTWIDTH] IN BLOOD BY AUTOMATED COUNT: 11.9 % (ref 10–15)
FASTING STATUS PATIENT QL REPORTED: YES
FASTING STATUS PATIENT QL REPORTED: YES
GLUCOSE SERPL-MCNC: 104 MG/DL (ref 70–99)
HCO3 SERPL-SCNC: 22 MMOL/L (ref 22–29)
HCT VFR BLD AUTO: 41.4 % (ref 35–47)
HDLC SERPL-MCNC: 90 MG/DL
HGB BLD-MCNC: 14.4 G/DL (ref 11.7–15.7)
LDLC SERPL CALC-MCNC: 122 MG/DL
MCH RBC QN AUTO: 30 PG (ref 26.5–33)
MCHC RBC AUTO-ENTMCNC: 34.8 G/DL (ref 31.5–36.5)
MCV RBC AUTO: 86 FL (ref 78–100)
NONHDLC SERPL-MCNC: 134 MG/DL
PLATELET # BLD AUTO: 254 10E3/UL (ref 150–450)
POTASSIUM SERPL-SCNC: 4 MMOL/L (ref 3.4–5.3)
PROT SERPL-MCNC: 7.2 G/DL (ref 6.4–8.3)
RBC # BLD AUTO: 4.8 10E6/UL (ref 3.8–5.2)
SODIUM SERPL-SCNC: 141 MMOL/L (ref 135–145)
TRIGL SERPL-MCNC: 58 MG/DL
TSH SERPL DL<=0.005 MIU/L-ACNC: 0.78 UIU/ML (ref 0.3–4.2)
WBC # BLD AUTO: 4.6 10E3/UL (ref 4–11)

## 2025-02-10 PROCEDURE — 80061 LIPID PANEL: CPT | Performed by: PHYSICIAN ASSISTANT

## 2025-02-10 PROCEDURE — 80053 COMPREHEN METABOLIC PANEL: CPT | Performed by: PHYSICIAN ASSISTANT

## 2025-02-10 PROCEDURE — 99214 OFFICE O/P EST MOD 30 MIN: CPT | Mod: 25 | Performed by: PHYSICIAN ASSISTANT

## 2025-02-10 PROCEDURE — 99396 PREV VISIT EST AGE 40-64: CPT | Performed by: PHYSICIAN ASSISTANT

## 2025-02-10 PROCEDURE — 84443 ASSAY THYROID STIM HORMONE: CPT | Performed by: PHYSICIAN ASSISTANT

## 2025-02-10 PROCEDURE — 36415 COLL VENOUS BLD VENIPUNCTURE: CPT | Performed by: PHYSICIAN ASSISTANT

## 2025-02-10 PROCEDURE — G2211 COMPLEX E/M VISIT ADD ON: HCPCS | Performed by: PHYSICIAN ASSISTANT

## 2025-02-10 PROCEDURE — 85027 COMPLETE CBC AUTOMATED: CPT | Performed by: PHYSICIAN ASSISTANT

## 2025-02-10 ASSESSMENT — PAIN SCALES - GENERAL: PAINLEVEL_OUTOF10: NO PAIN (0)

## 2025-02-10 NOTE — PATIENT INSTRUCTIONS
Patient Education   Instructions from Today's Visit:  To schedule your Imaging Test or Specialty Referral please call: call to schedule the lung nodule CT- early 2025.   [facility]   Radiology (imaging tests:  mammogram, ultrasound, CT, MRI): [phone]  Speciality consultation [phone]  [address]  [place] [address]      Other Mammogram Options:  [place] Region Locations:  [place], [place], [place], [place]*, [place]* ([facility]), [facility]*-- Call to schedule [phone]  South Region Locations: [place], [place], [facility] ([place], [place]*, [place], [facility] [place] [place] [place], [place], [place]-- Call to schedule [phone]   [facility] Locations: [place]* and [facility]-- Call to schedule [phone]  *locations marked with a star have 3D mammography available     General Instructions After Your Visit  If you have been seen for a concern and are worsening or not improving as expected, please schedule a follow-up visit or reach out to a member of our care team or nurses if it is urgent.  We have Nurse advice available  by calling [phone].  For emergencies, please call [phone].    My Clinic Hours  I am in the office , , and . Messages received outside of normal clinic hours and on my days out of the office will be reviewed by our [place] care team, but may not be addressed by me personally until I am back in the office. If you have concerns that cannot wait for my return please call the Nurse advise line [phone].    Test results  You may see your lab or test results before we can make recommendations. This is common, as sometimes we are awaiting on other labs to return or we are out of office on a particular day. Please be patient, and if you don't see a response from me  or one of my colleagues within 2-3 business days, and you have a specific concern, please send us a message.    Refills  If you have run out of refills, please schedule a visit. This is generally an indication that you are due for a follow-up visit. All prescriptions are only valid for 1 year from the date written and need a visit annually to renew. Most mental health and chronic diseases we are treating (diabetes, high blood pressure, etc) require some type of visit every 6 months. We are now offering video visits! However, if physical exams are needed or it is a complex concern, we may ask you to be seen in person.    Physicals & Preventative Visits   These appointment slots fill up fast. Please consider scheduling these 2-3 months in advance to allow for the appointment time that fits you best. If you have medications ordered or other issues addressed that are not preventive at these visits, please be aware there are extra costs associated with this.       Preventive Care Advice   This is general advice given by our system to help you stay healthy. However, your care team may have specific advice just for you. Please talk to your care team about your preventive care needs.  Nutrition  Eat 5 or more servings of fruits and vegetables each day.  Try wheat bread, brown rice and whole grain pasta (instead of white bread, rice, and pasta).  Get enough calcium and vitamin D. Check the label on foods and aim for 100% of the RDA (recommended daily allowance).  Lifestyle  Exercise at least 150 minutes each week  (30 minutes a day, 5 days a week).  Do muscle strengthening activities 2 days a week. These help control your weight and prevent disease.  No smoking.  Wear sunscreen to prevent skin cancer.  Have a dental exam and cleaning every 6 months.  Yearly exams  See your health care team every year to talk about:  Any changes in your health.  Any medicines your care team has prescribed.  Preventive care, family planning,  and ways to prevent chronic diseases.  Shots (vaccines)   HPV shots (up to age 26), if you've never had them before.  Hepatitis B shots (up to age 59), if you've never had them before.  COVID-19 shot: Get this shot when it's due.  Flu shot: Get a flu shot every year.  Tetanus shot: Get a tetanus shot every 10 years.  Pneumococcal, hepatitis A, and RSV shots: Ask your care team if you need these based on your risk.  Shingles shot (for age 50 and up)  General health tests  Diabetes screening:  Starting at age 35, Get screened for diabetes at least every 3 years.  If you are younger than age 35, ask your care team if you should be screened for diabetes.  Cholesterol test: At age 39, start having a cholesterol test every 5 years, or more often if advised.  Bone density scan (DEXA): At age 50, ask your care team if you should have this scan for osteoporosis (brittle bones).  Hepatitis C: Get tested at least once in your life.  STIs (sexually transmitted infections)  Before age 24: Ask your care team if you should be screened for STIs.  After age 24: Get screened for STIs if you're at risk. You are at risk for STIs (including HIV) if:  You are sexually active with more than one person.  You don't use condoms every time.  You or a partner was diagnosed with a sexually transmitted infection.  If you are at risk for HIV, ask about PrEP medicine to prevent HIV.  Get tested for HIV at least once in your life, whether you are at risk for HIV or not.  Cancer screening tests  Cervical cancer screening: If you have a cervix, begin getting regular cervical cancer screening tests starting at age 21.  Breast cancer scan (mammogram): If you've ever had breasts, begin having regular mammograms starting at age 40. This is a scan to check for breast cancer.  Colon cancer screening: It is important to start screening for colon cancer at age 45.  Have a colonoscopy test every 10 years (or more often if you're at risk) Or, ask your provider  about stool tests like a FIT test every year or Cologuard test every 3 years.  To learn more about your testing options, visit:   .  For help making a decision, visit:   https://bit.ly/kd74448.  Prostate cancer screening test: If you have a prostate, ask your care team if a prostate cancer screening test (PSA) at age 55 is right for you.  Lung cancer screening: If you are a current or former smoker ages 50 to 80, ask your care team if ongoing lung cancer screenings are right for you.  For informational purposes only. Not to replace the advice of your health care provider. Copyright   2023 [facility]. All rights reserved. Clinically reviewed by the [facility] Transitions Program. [employer] 468313 - REV 01/24.

## 2025-02-10 NOTE — PROGRESS NOTES
Preventive Care Visit  [facility] RAUL Montez PA-C, Family Medicine  Feb 10, 2025      Assessment & Plan     Routine general medical examination at a health care facility  Reviewed VS, weight/BMI   Routine screenings see orders  Immunizations: see orders and documentation in HPI. Discussed vaccines coverage as pharmacy benefit.  Health and cancer screening recommendations delivered according to the USPSTF and other appropriate society guidelines  Nutrition and exercise counseling performed.    - PRIMARY CARE FOLLOW-UP SCHEDULING  - MA Screen Bilateral w/Inder; Future  - Lipid panel reflex to direct LDL Fasting; Future  - Lipid panel reflex to direct LDL Fasting    Irritable bowel syndrome with diarrhea  Uses bentyl sporadically. Helpful. Refilled for prn use.   - dicyclomine (BENTYL) 10 MG capsule; Take 1 capsule (10 mg) by mouth 4 times daily as needed (for IBS symptoms).    Pulmonary nodule, right  Reviewed ER imaging report. Due for repeat lung nodule CT in 6mo- July 2025.   - CT Chest w/o Contrast; Future    Hx of papillary thyroid carcinoma  She is overdue for follow up visit with endocrine and monitoring US. She was referred back past few years but didn't follow through due to some coverage issues with her insurance. I am unclear what her surveillance recommendations would be at this stage. Did again today advise she schedule her follow up.   Did place a re-referral back to endocrinology.      - TSH with free T4 reflex; Future  - TSH with free T4 reflex    Hepatic steatosis   Enzymes normalized last year with discontinuing supplements and alcohol.  She has continued with healthy eating and avoiding alcohol.   Had negative hepatitis labs, normal cbc/ferritin/iron studies.  Repeating labs for surveillance  Offered hepatology consult. She would like to hold off for now   - CBC with platelets; Future  - Comprehensive metabolic panel (BMP + Alb, Alk Phos, ALT, AST, Total. Bili, TP);  "Future  - CBC with platelets  - Comprehensive metabolic panel (BMP + Alb, Alk Phos, ALT, AST, Total. Bili, TP)    Strain of lumbar region, initial encounter  Has about 1 flare per year. Able to manage with ice,heat muscle relaxers. Refilled. Med precautions discussed   - cyclobenzaprine (FLEXERIL) 10 MG tablet; Take 1 tablet (10 mg) by mouth 3 times daily as needed for muscle spasms.    Polyarthralgia  Helpful for upper back, shoulder pains and hands- due to repetitive tasks as . Tolerates well. Refilled.  - gabapentin (NEURONTIN) 300 MG capsule; Take 1 capsule (300 mg) by mouth at bedtime.    Encounter for screening mammogram for malignant neoplasm of breast  Discussed screening; advised and ordered mammogram. Pt given info to schedule.   - MA Screen Bilateral w/Inder; Future    Follow Up: see above. Additionally patient was instructed to contact clinic for worsening symptoms, non-improvement in time frame discussed, and for questions regarding treatment plan.   For virtual visits, the patient was advised to be seen for in person evaluation if symptoms or condition are worsening or non-improvement as expected.   Tomasa Montez PA-C        BMI  Estimated body mass index is 32 kg/m  as calculated from the following:    Height as of this encounter: 1.625 m (5' 3.98\").    Weight as of this encounter: 84.5 kg (186 lb 4.8 oz).   Weight management plan: Discussed healthy diet and exercise guidelines    Counseling  Appropriate preventive services were addressed with this patient via screening, questionnaire, or discussion as appropriate for fall prevention, nutrition, physical activity, Tobacco-use cessation, social engagement, weight loss and cognition.  Checklist reviewing preventive services available has been given to the patient.  Reviewed patient's diet, addressing concerns and/or questions.   She is at risk for lack of exercise and has been provided with information to increase physical activity for " the benefit of her well-being.       Follow Up: see above. Additionally patient was instructed to contact clinic for worsening symptoms, non-improvement in time frame discussed, and for questions regarding treatment plan.   For virtual visits, the patient was advised to be seen for in person evaluation if symptoms or condition are worsening or non-improvement as expected.   Tomasa Montez PA-C    Massimo Amaya is a 54 year old, presenting for the following:  Physical        2/10/2025     9:20 AM   Additional Questions   Roomed by AG   Accompanied by self          HPI    Expand All Collapse All         Routine Health Maintenance:  Immunizations- declines flu   Mammogram: 04/2023  Colonoscopy: 02/2023- repeat in 10 yr.   Fasting: yes        GYN Hx:s/p hyst- pap no longer indicated  - due to fibroids and heavy periods .Menopause: no bleeding, no concerns      Hx of papillary thyroid cancer on the left - s/p left hemithyroidectomy 2016. Was referred back to endocrine last year at her annual Jan 2024. Had difficulty getting scheduled after she was referred in Jan 2024. She was scheduled in sept 2024 and then it got cancelled. She isn't sure if she needs follow up or what to do.      Incidental findings on CT  Was seen in the ER for dull RLQ abdominal pain last month (Jan 2025). Her CT scan that showed no acute finding in abdomen for the pain, fatty liver and liver cysts, and lung nodule.  Was told to see primary care.   Imaging Results - CT ABDOMEN & PELVIS W/O ORAL W IV CON (01/07/2025 11:43 PM CST)  Impressions   01/08/2025 12:05 AM CST   IMPRESSION:    1.  No acute abnormality in the abdomen or pelvis.  2.  Colonic diverticulosis without diverticulitis. Numerous hepatic cysts and presumed hepatic steatosis.  3.  Right middle lobe 6 mm solid pulmonary nodule. Recommend follow-up low-dose chest CT in 6-12 months.    REPORT SIGNED BY DR. Pasha Godwin          Elevated liver enzymes- at her annual exam last  year Jan 2024. US showing hepatic steatosis changes and liver cysts.  She stopped supplements she was taking and worked on dietary changes.   Quit alcohol  Her enzymes normalized over 3-4 weeks.   Her hepatitis labs were negative/normal. Her ferritin and iron studies were normal. Inflammatory markers were low/normal.   Her abd US showed fatty liver changes and liver cysts.     IBS- uses dicyclomine sporadically few times a month. It is helpful. No side effects    Gabapentin for neck/shoulder pain (). Needed to stop NSAID use due to gastritis and stopped acetaminophen due to elevated liver enzymes.   It is effective for her. Also utilizing monthly massage and that has been really helpful too.     Low back pain- uses flexeril once a year with a flare. Tolerates well.             Health Care Directive  Patient does not have a Health Care Directive: Discussed advance care planning with patient; information given to patient to review.      2/5/2025   General Health   How would you rate your overall physical health? Good   Feel stress (tense, anxious, or unable to sleep) Not at all         2/5/2025   Nutrition   Three or more servings of calcium each day? Yes   Diet: Regular (no restrictions)   How many servings of fruit and vegetables per day? (!) 2-3   How many sweetened beverages each day? 0-1         2/5/2025   Exercise   Days per week of moderate/strenous exercise 1 day   Average minutes spent exercising at this level 10 min   (!) EXERCISE CONCERN      2/5/2025   Social Factors   Frequency of gathering with friends or relatives Once a week   Worry food won't last until get money to buy more No   Food not last or not have enough money for food? No   Do you have housing? (Housing is defined as stable permanent housing and does not include staying ouside in a car, in a tent, in an abandoned building, in an overnight shelter, or couch-surfing.) Yes   Are you worried about losing your housing? No   Lack of  transportation? No   Unable to get utilities (heat,electricity)? No         2025   Fall Risk   Fallen 2 or more times in the past year? No   Trouble with walking or balance? No          2025   Dental   Dentist two times every year? Yes            Today's PHQ-2 Score:       2/10/2025     9:11 AM   PHQ-2 (  [employer])   Q1: Little interest or pleasure in doing things 0   Q2: Feeling down, depressed or hopeless 0   PHQ-2 Score 0    Q1: Little interest or pleasure in doing things Not at all   Q2: Feeling down, depressed or hopeless Not at all   PHQ-2 Score 0       Patient-reported           2025   Substance Use   Alcohol more than 3/day or more than 7/wk No   Do you use any other substances recreationally? No     Social History     Tobacco Use    Smoking status: Former     Current packs/day: 0.00     Average packs/day: 1 pack/day for 10.0 years (10.0 ttl pk-yrs)     Types: Cigarettes     Start date: 5/15/1987     Quit date: 5/15/1997     Years since quittin.7     Passive exposure: Past    Smokeless tobacco: Never   Vaping Use    Vaping status: Never Used   Substance Use Topics    Alcohol use: Yes     Alcohol/week: 3.0 standard drinks of alcohol     Types: 3 Standard drinks or equivalent per week     Comment: A lillte / occasional - 3-4 per week    Drug use: No           2023   LAST FHS-7 RESULTS   1st degree relative breast or ovarian cancer No   Any relative bilateral breast cancer No   Any male have breast cancer No   Any ONE woman have BOTH breast AND ovarian cancer No   Any woman with breast cancer before 50yrs No   2 or more relatives with breast AND/OR ovarian cancer No   2 or more relatives with breast AND/OR bowel cancer No        Mammogram Screening - Mammogram every 1-2 years updated in Health Maintenance based on mutual decision making        2025   STI Screening   New sexual partner(s) since last STI/HIV test? No     History of abnormal Pap smear: Status post hysterectomy with  removal of cervix and no history of CIN2 or greater or cervical cancer. Health Maintenance and Surgical History updated.       ASCVD Risk   The 10-year ASCVD risk score (Mena DK, et al., 2019) is: 1.2%    Values used to calculate the score:      Age: 54 years      Sex: Female      Is Non- : No      Diabetic: No      Tobacco smoker: No      Systolic Blood Pressure: 110 mmHg      Is BP treated: No      HDL Cholesterol: 74 mg/dL      Total Cholesterol: 209 mg/dL           Reviewed and updated as needed this visit by Provider                    Past Medical History:   Diagnosis Date    Cancer (H)     Thyroid, my aunt & daughter    Congestive heart failure (H)     Mom    DDD (degenerative disc disease), lumbar 10/31/2016    Diabetes (H)     Mom    H/O hysterectomy for benign disease     Motion sickness     Pap smear of cervix not needed     PONV (postoperative nausea and vomiting)     Thyroid nodule 05/26/2015     Past Surgical History:   Procedure Laterality Date    BIOPSY  5/15 & 2/16    Both on thyroid    COLONOSCOPY N/A 2/13/2023    Procedure: COLONOSCOPY, WITH POLYPECTOMY AND BIOPSY;  Surgeon: Griselda Yepez MD;  Location: [facility] OR    COLONOSCOPY WITH CO2 INSUFFLATION N/A 2/13/2023    Procedure: COLONOSCOPY, WITH CO2 INSUFFLATION;  Surgeon: Griselda Yepez MD;  Location: [facility] OR    FUSION LUMBAR ANTERIOR ONE LEVEL N/A 11/9/2016    Procedure: FUSION LUMBAR ANTERIOR ONE LEVEL;  Surgeon: Kit Barrera MD;  Location: [facility] OR    GYN SURGERY  8/2008    Hysterectomy    HEAD & NECK SURGERY  3/11/16    Partial thyroid lobectomy, left lobe    HYSTERECTOMY  8/2008    heavy menses, fibroids    HYSTERECTOMY, PAP NO LONGER INDICATED  2008    OPTICAL TRACKING SYSTEM FUSION SPINE POSTERIOR LUMBAR PERCUTANEOUS ONE LEVEL N/A 11/9/2016    Procedure: OPTICAL TRACKING SYSTEM FUSION SPINE POSTERIOR LUMBAR PERCUTANEOUS ONE LEVEL;  Surgeon: Kit Barrera MD;  Location: [facility] OR      Lab work is in process  Labs reviewed in EPIC  BP Readings from Last 3 Encounters:   02/10/25 110/78   24 102/70   24 102/70    Wt Readings from Last 3 Encounters:   02/10/25 84.5 kg (186 lb 4.8 oz)   24 82.6 kg (182 lb)   24 83 kg (183 lb)                  Patient Active Problem List   Diagnosis    Family history of thyroid cancer    Family history of thyroid disease    CARDIOVASCULAR SCREENING; LDL GOAL LESS THAN 160    Irritable bowel syndrome with diarrhea    DDD (degenerative disc disease), lumbar    Spondylolisthesis, lumbar region    Pap smear of cervix not needed    H/O hysterectomy for benign disease    Papillary thyroid carcinoma (H)     Past Surgical History:   Procedure Laterality Date    BIOPSY  5/15 &     Both on thyroid    COLONOSCOPY N/A 2023    Procedure: COLONOSCOPY, WITH POLYPECTOMY AND BIOPSY;  Surgeon: Griselda Yepez MD;  Location: [facility]    COLONOSCOPY WITH CO2 INSUFFLATION N/A 2023    Procedure: COLONOSCOPY, WITH CO2 INSUFFLATION;  Surgeon: Griselda Yepez MD;  Location: [facility]    FUSION LUMBAR ANTERIOR ONE LEVEL N/A 2016    Procedure: FUSION LUMBAR ANTERIOR ONE LEVEL;  Surgeon: Kit Barrera MD;  [facility]    GYN SURGERY  2008    Hysterectomy    HEAD & NECK SURGERY  3/11/16    Partial thyroid lobectomy, left lobe    HYSTERECTOMY  2008    heavy menses, fibroids    HYSTERECTOMY, PAP NO LONGER INDICATED      OPTICAL TRACKING SYSTEM FUSION SPINE POSTERIOR LUMBAR PERCUTANEOUS ONE LEVEL N/A 2016    Procedure: OPTICAL TRACKING SYSTEM FUSION SPINE POSTERIOR LUMBAR PERCUTANEOUS ONE LEVEL;  Surgeon: Kit Barrera MD;  [facility]: [facility]       Social History     Tobacco Use    Smoking status: Former     Current packs/day: 0.00     Average packs/day: 1 pack/day for 10.0 years (10.0 ttl pk-yrs)     Types: Cigarettes     Start date: 5/15/1987     Quit date: 5/15/1997     Years since quittin.7     Passive  exposure: Past    Smokeless tobacco: Never   Substance Use Topics    Alcohol use: Yes     Alcohol/week: 3.0 standard drinks of alcohol     Types: 3 Standard drinks or equivalent per week     Comment: A lillte / occasional - 3-4 per week     Family History   Problem Relation Age of Onset    Diabetes Mother     Thyroid Disease Mother         goiter     Coronary Artery Disease Mother 68    Hypertension Father     Thyroid Disease Maternal Grandmother         Goiter     Thyroid Disease Daughter         thyrpoi CA, Dx 2/2014    Other Cancer Daughter     Thyroid Disease Daughter     Thyroid Disease Maternal Aunt         Thyroid CA, 2013 - 2014    Other Cancer Maternal Aunt     Other Cancer Cousin     Thyroid Disease Cousin         thyroid CA,     Other Cancer Other     Hyperlipidemia No family hx of     Breast Cancer No family hx of     Cancer - colorectal No family hx of     Ovarian Cancer No family hx of     Prostate Cancer No family hx of     Cerebrovascular Disease No family hx of     Asthma No family hx of     Known Genetic Syndrome No family hx of     Colon Cancer No family hx of     Anxiety Disorder No family hx of     Substance Abuse No family hx of     Anesthesia Reaction No family hx of          Current Outpatient Medications   Medication Sig Dispense Refill    acetaminophen (TYLENOL) 500 MG tablet Take 500-1,000 mg by mouth every 6 hours as needed for mild pain      cyclobenzaprine (FLEXERIL) 10 MG tablet Take 1 tablet (10 mg) by mouth 3 times daily as needed for muscle spasms 30 tablet 0    dicyclomine (BENTYL) 10 MG capsule Take 1 capsule (10 mg) by mouth 4 times daily as needed (for IBS symptoms) 30 capsule 1    econazole nitrate 1 % external cream Apply topically daily To affected toenails. 85 g 5    gabapentin (NEURONTIN) 300 MG capsule Take 1 capsule (300 mg) by mouth at bedtime. 90 capsule 0    ibuprofen (ADVIL/MOTRIN) 200 MG tablet Take 200 mg by mouth every 4 hours as needed for pain       No Known  "Allergies      Review of Systems  Constitutional, HEENT, cardiovascular, pulmonary, gi and gu systems are negative, except as otherwise noted.     Objective    Exam  /78 (BP Location: Left arm, Patient Position: Chair, Cuff Size: Adult Large)   Pulse 87   Temp 96.9  F (36.1  C) (Temporal)   Resp 12   Ht 1.625 m (5' 3.98\")   Wt 84.5 kg (186 lb 4.8 oz)   SpO2 96%   BMI 32.00 kg/m     Estimated body mass index is 32 kg/m  as calculated from the following:    Height as of this encounter: 1.625 m (5' 3.98\").    Weight as of this encounter: 84.5 kg (186 lb 4.8 oz).    Physical Exam  GENERAL: alert and no distress  EYES: Eyes grossly normal to inspection, PERRL and conjunctivae and sclerae normal  HENT: ear canals and TM's normal, nose and mouth without ulcers or lesions  NECK: no adenopathy, no asymmetry, masses, or scars  RESP: lungs clear to auscultation - no rales, rhonchi or wheezes  CV: regular rate and rhythm, normal S1 S2, no S3 or S4, no murmur, click or rub, no peripheral edema  ABDOMEN: soft, nontender, no hepatosplenomegaly, no masses and bowel sounds normal  MS: no gross musculoskeletal defects noted, no edema  SKIN: no suspicious lesions or rashes  NEURO: Normal strength and tone, mentation intact and speech normal  PSYCH: mentation appears normal, affect normal/bright    Results for orders placed or performed in visit on 02/10/25   CBC with platelets     Status: Normal   Result Value Ref Range    WBC Count 4.6 4.0 - 11.0 10e3/uL    RBC Count 4.80 3.80 - 5.20 10e6/uL    Hemoglobin 14.4 11.7 - 15.7 g/dL    Hematocrit 41.4 35.0 - 47.0 %    MCV 86 78 - 100 fL    MCH 30.0 26.5 - 33.0 pg    MCHC 34.8 31.5 - 36.5 g/dL    RDW 11.9 10.0 - 15.0 %    Platelet Count 254 150 - 450 10e3/uL   Comprehensive metabolic panel (BMP + Alb, Alk Phos, ALT, AST, Total. Bili, TP)     Status: Abnormal   Result Value Ref Range    Sodium 141 135 - 145 mmol/L    Potassium 4.0 3.4 - 5.3 mmol/L    Carbon Dioxide (CO2) 22 22 - " 29 mmol/L    Anion Gap 12 7 - 15 mmol/L    Urea Nitrogen 13.8 6.0 - 20.0 mg/dL    Creatinine 0.62 0.51 - 0.95 mg/dL    GFR Estimate >90 >60 mL/min/1.73m2    Calcium 9.3 8.8 - 10.4 mg/dL    Chloride 107 98 - 107 mmol/L    Glucose 104 (H) 70 - 99 mg/dL    Alkaline Phosphatase 57 40 - 150 U/L    AST 25 0 - 45 U/L    ALT 39 0 - 50 U/L    Protein Total 7.2 6.4 - 8.3 g/dL    Albumin 4.4 3.5 - 5.2 g/dL    Bilirubin Total 0.2 <=1.2 mg/dL    Patient Fasting > 8hrs? Yes    TSH with free T4 reflex     Status: Normal   Result Value Ref Range    TSH 0.78 0.30 - 4.20 uIU/mL   Lipid panel reflex to direct LDL Fasting     Status: Abnormal   Result Value Ref Range    Cholesterol 224 (H) <200 mg/dL    Triglycerides 58 <150 mg/dL    Direct Measure HDL 90 >=50 mg/dL    LDL Cholesterol Calculated 122 (H) <100 mg/dL    Non HDL Cholesterol 134 (H) <130 mg/dL    Patient Fasting > 8hrs? Yes     Narrative    Cholesterol  Desirable: < 200 mg/dL  Borderline High: 200 - 239 mg/dL  High: >= 240 mg/dL    Triglycerides  Normal: < 150 mg/dL  Borderline High: 150 - 199 mg/dL  High: 200-499 mg/dL  Very High: >= 500 mg/dL    Direct Measure HDL  Female: >= 50 mg/dL   Male: >= 40 mg/dL    LDL Cholesterol  Desirable: < 100 mg/dL  Above Desirable: 100 - 129 mg/dL   Borderline High: 130 - 159 mg/dL   High:  160 - 189 mg/dL   Very High: >= 190 mg/dL    Non HDL Cholesterol  Desirable: < 130 mg/dL  Above Desirable: 130 - 159 mg/dL  Borderline High: 160 - 189 mg/dL  High: 190 - 219 mg/dL  Very High: >= 220 mg/dL         Signed Electronically by: Tomasa Montez PA-C

## 2025-02-11 ENCOUNTER — PATIENT OUTREACH (OUTPATIENT)
Dept: CARE COORDINATION | Facility: CLINIC | Age: 55
End: 2025-02-11
Payer: COMMERCIAL

## 2025-02-12 PROBLEM — K76.0 HEPATIC STEATOSIS: Status: ACTIVE | Noted: 2025-02-12

## 2025-02-12 RX ORDER — DICYCLOMINE HYDROCHLORIDE 10 MG/1
10 CAPSULE ORAL 4 TIMES DAILY PRN
Qty: 30 CAPSULE | Refills: 1 | Status: SHIPPED | OUTPATIENT
Start: 2025-02-12

## 2025-02-12 RX ORDER — CYCLOBENZAPRINE HCL 10 MG
10 TABLET ORAL 3 TIMES DAILY PRN
Qty: 30 TABLET | Refills: 0 | Status: SHIPPED | OUTPATIENT
Start: 2025-02-12

## 2025-02-12 RX ORDER — GABAPENTIN 300 MG/1
300 CAPSULE ORAL AT BEDTIME
Qty: 90 CAPSULE | Refills: 1 | Status: SHIPPED | OUTPATIENT
Start: 2025-02-12

## 2025-02-25 ENCOUNTER — MYC MEDICAL ADVICE (OUTPATIENT)
Dept: FAMILY MEDICINE | Facility: CLINIC | Age: 55
End: 2025-02-25
Payer: COMMERCIAL

## 2025-03-04 ENCOUNTER — TELEPHONE (OUTPATIENT)
Dept: ENDOCRINOLOGY | Facility: CLINIC | Age: 55
End: 2025-03-04
Payer: COMMERCIAL

## 2025-03-04 ENCOUNTER — TELEPHONE (OUTPATIENT)
Dept: ENDOCRINOLOGY | Facility: CLINIC | Age: 55
End: 2025-03-04

## 2025-03-04 NOTE — TELEPHONE ENCOUNTER
----- Message from Tomasa Montez sent at 3/4/2025 10:31 AM CST -----  Hello-   I received a message that an E-consult should be considered for this patient.     Monique had diagnosis of left papillary thyroid cancer is s/p left lobectomy in 2016. She was following with  through 2020. She had imaging in 2020 thyroid and cervical lymph nodes with recommended repeat biopsy then covid pandemic happened and she was lost to follow up. She was referred back in 2022 and  advised US and appt. Then it looks like there were insurance issues and that appointment didn't happen. Had another referral back and her appoint was canceled or rescheduled for almost a year out from referral. At this point, what follow up needs to happen? This seems out of the bounds of an E-consult. She is currently scheduled for Dec 2025.    Tomasa Montez PA-C

## 2025-03-04 NOTE — TELEPHONE ENCOUNTER
Left Voicemail (1st Attempt) and Sent Mychart (1st Attempt) for the patient to call back and schedule the following:    Appointment type: NEW THYROID CANCER  Provider: see below  Return date: next available, [place]  Specialty phone number: [phone]  Additional appointment(s) needed: N/A  Additonal Notes: LVM, MyCx1. Pt was established with [facility] in 2019 but 2024 visit was cancelled and not rescheduled.    Jocy Ventura MD  [id number]; Abby Marie, ELIDA; Tomasa Montez PA-C; Jocy Ventura MD  To schedulers : please offer to move forward on schedule with either  Dr Johns, Rekha, Cleve, Oniel, Kiya, Olivia, Julio Cesar, Madyson, Constantin , Loc, Love, Gabriela, Layla, J Carlos, Jl, Luis using NON-CALL week open new/[facility] (60 minutes) or 2 back to back 30 minute [facility] spaces.     Nkechi Smith CMA  [id number]  Yes, this is fine. She can be in person or by video.    Available appts:  Andreas 03/05 10:00 in person OR virtual [facility] ([facility] team gave OK to combine 2 returns)  Olivia 05/05 in person [facility]  Kiya 05/06 virtual [facility]  Kiya 05/07 in person [facility]  Loc 05/20 in person [facility]    Please note that the above appointment(s) will require manual scheduling as they are marked as [facility] and will not appear using auto search. Do not schedule the patient if another patient has already been scheduled in the requested appointment slot.     Miracle Lozoya on 3/4/2025 at 2:51 PM

## 2025-03-05 ENCOUNTER — VIRTUAL VISIT (OUTPATIENT)
Dept: ENDOCRINOLOGY | Facility: CLINIC | Age: 55
End: 2025-03-05
Attending: PHYSICIAN ASSISTANT
Payer: COMMERCIAL

## 2025-03-05 VITALS — HEIGHT: 64 IN | BODY MASS INDEX: 30.73 KG/M2 | WEIGHT: 180 LBS

## 2025-03-05 DIAGNOSIS — Z85.850 HX OF PAPILLARY THYROID CARCINOMA: ICD-10-CM

## 2025-03-05 PROCEDURE — G2211 COMPLEX E/M VISIT ADD ON: HCPCS | Performed by: STUDENT IN AN ORGANIZED HEALTH CARE EDUCATION/TRAINING PROGRAM

## 2025-03-05 PROCEDURE — 1126F AMNT PAIN NOTED NONE PRSNT: CPT | Performed by: STUDENT IN AN ORGANIZED HEALTH CARE EDUCATION/TRAINING PROGRAM

## 2025-03-05 PROCEDURE — 98007 SYNCH AUDIO-VIDEO EST HI 40: CPT | Performed by: STUDENT IN AN ORGANIZED HEALTH CARE EDUCATION/TRAINING PROGRAM

## 2025-03-05 ASSESSMENT — PAIN SCALES - GENERAL: PAINLEVEL_OUTOF10: NO PAIN (0)

## 2025-03-05 NOTE — LETTER
3/5/2025      Cuca Tyler  [address] [place] [address]      Dear Colleague,    Thank you for referring your patient, Cuca Tyler, to the [facility]. Please see a copy of my visit note below.    Endocrinology Clinic Visit 3/5/2025      Video-Visit Details    Type of service:  Video Visit  Joined the call at 3/5/2025, 10:08:18 am.  Left the call at 3/5/2025, 10:22:43 am.    Originating Location (pt. Location): Home    {PROVIDER LOCATION On-site should be selected for visits conducted from your clinic location or adjoining [facility] hospital, academic office, or other nearby [facility] building. Off-site should be selected for all other provider locations, including home:[id number]}    Distant Location (provider location):  Off-site    Mode of Communication:  Video Conference via [facility]    Physician has received verbal consent for a Video Visit from the patient? Yes    I spent a total of 48 minutes on the date of encounter reviewing medical records, evaluating the patient, coordinating care and documenting in the EHR, as detailed above.      NAME:  Cuca Tyler  PCP:  Tomasa Montez  MRN:  [id number]  Reason for Consult:  PTC   Requesting Provider:  Tomasa Montez    Chief Complaint     Chief Complaint   Patient presents with     Thyroid Cancer       History of Present Illness     Cuca Tyler is a 54 year old female who is seen in video visit for left papillary thyroid cancer s/p left lobectomy     Cuca has known hx of left papillary thyroid cancer s/p left lobectomy since 2016. She was found to have left papillary thyroid cancer based on ultrasound in 2015 that showed 5.5 cm left sided heterogeneous nodule and 0.9 cm hypoechoic nodule at the isthmus along the right side. FNA x2 were AUS. She ultimately underwent left lobectomy by  on 3/11/2016 that showed 0.5 cm micropapillary carcinoma with background multinodular hyperplasia with no lymph node  involvement.   She lost follow-up since after her surgery and then established with Dr. Davey in 2019. SHE HAD A NECK US on 2/2020 which showed left level 4 LN of 1.6 cm with reduced hilum. S/p FNA with negative TG. Since then she lost follow-up, no NECK US done.  Her TSH has been at goal between 0.5-2, last checked 2/10/25 was 0.78.  she was never on lt4.  Today she reported feeling without any concerns or complaints. She denied any antorior neck symptoms, no lumps or bumps.       She has no prior hx of radiation exposure. Other medical problems significant for back pain, IBS and S/P hysterectomy.    She has a family hx of thyroid cancer: daughter had thyroid cancer at age 21, PTC. Aunt with PTC and also thinks great-grandmother had thyroid cancer.    Social: she is a . She does not smoke.      Problem List     Patient Active Problem List   Diagnosis     Family history of thyroid cancer     Family history of thyroid disease     CARDIOVASCULAR SCREENING; LDL GOAL LESS THAN 160     Irritable bowel syndrome with diarrhea     DDD (degenerative disc disease), lumbar     Spondylolisthesis, lumbar region     Pap smear of cervix not needed     H/O hysterectomy for benign disease     Papillary thyroid carcinoma (H)     Hepatic steatosis        Medications     Current Outpatient Medications   Medication Sig Dispense Refill     acetaminophen (TYLENOL) 500 MG tablet Take 500-1,000 mg by mouth every 6 hours as needed for mild pain       cyclobenzaprine (FLEXERIL) 10 MG tablet Take 1 tablet (10 mg) by mouth 3 times daily as needed for muscle spasms. 30 tablet 0     dicyclomine (BENTYL) 10 MG capsule Take 1 capsule (10 mg) by mouth 4 times daily as needed (for IBS symptoms). 30 capsule 1     econazole nitrate 1 % external cream Apply topically daily To affected toenails. 85 g 5     gabapentin (NEURONTIN) 300 MG capsule Take 1 capsule (300 mg) by mouth at bedtime. 90 capsule 1     ibuprofen (ADVIL/MOTRIN) 200 MG tablet  Take 200 mg by mouth every 4 hours as needed for pain       No current facility-administered medications for this visit.     Facility-Administered Medications Ordered in Other Visits   Medication Dose Route Frequency Provider Last Rate Last Admin     lidocaine 1 % injection 9 mL  9 mL Subcutaneous Once Tara Sanchez APRN CNP         sodium bicarbonate 8.4 % injection 1 mEq  1 mEq Other Once Tara Sanchez APRN CNP            Allergies     No Known Allergies    Medical / Surgical History     Past Medical History:   Diagnosis Date     Cancer (H)     Thyroid, my aunt & daughter     Congestive heart failure (H)     Mom     DDD (degenerative disc disease), lumbar 10/31/2016     Diabetes (H)     Mom     H/O hysterectomy for benign disease      Motion sickness      Pap smear of cervix not needed      PONV (postoperative nausea and vomiting)      Thyroid nodule 05/26/2015     Past Surgical History:   Procedure Laterality Date     BIOPSY  5/15 & 2/16    Both on thyroid     COLONOSCOPY N/A 2/13/2023    Procedure: COLONOSCOPY, WITH POLYPECTOMY AND BIOPSY;  Surgeon: Griselda Yepez MD;  Location: [facility] OR     COLONOSCOPY WITH CO2 INSUFFLATION N/A 2/13/2023    Procedure: COLONOSCOPY, WITH CO2 INSUFFLATION;  Surgeon: Griselda Yepez MD;  Location: [facility] OR     FUSION LUMBAR ANTERIOR ONE LEVEL N/A 11/9/2016    Procedure: FUSION LUMBAR ANTERIOR ONE LEVEL;  Surgeon: Kit Barrera MD;  Location: [facility] OR     GYN SURGERY  8/2008    Hysterectomy     HEAD & NECK SURGERY  3/11/16    Partial thyroid lobectomy, left lobe     HYSTERECTOMY  8/2008    heavy menses, fibroids     HYSTERECTOMY, PAP NO LONGER INDICATED  2008     OPTICAL TRACKING SYSTEM FUSION SPINE POSTERIOR LUMBAR PERCUTANEOUS ONE LEVEL N/A 11/9/2016    Procedure: OPTICAL TRACKING SYSTEM FUSION SPINE POSTERIOR LUMBAR PERCUTANEOUS ONE LEVEL;  Surgeon: Kit Barrera MD;  Location: [facility] OR       Social History     Social History      Socioeconomic History     Marital status:      Spouse name: Not on file     Number of children: Not on file     Years of education: Not on file     Highest education level: Not on file   Occupational History     Not on file   Tobacco Use     Smoking status: Former     Current packs/day: 0.00     Average packs/day: 1 pack/day for 10.0 years (10.0 ttl pk-yrs)     Types: Cigarettes     Start date: 5/15/1987     Quit date: 5/15/1997     Years since quittin.8     Passive exposure: Past     Smokeless tobacco: Never   Vaping Use     Vaping status: Never Used   Substance and Sexual Activity     Alcohol use: Yes     Alcohol/week: 3.0 standard drinks of alcohol     Types: 3 Standard drinks or equivalent per week     Comment: A lillte / occasional - 3-4 per week     Drug use: No     Sexual activity: Yes     Partners: Male     Birth control/protection: Female Surgical     Comment:  one partner; hysterectomy   Other Topics Concern     Parent/sibling w/ CABG, MI or angioplasty before 65F 55M? No   Social History Narrative     Not on file     Social Drivers of Health     Financial Resource Strain: Low Risk  (2025)    Financial Resource Strain      Within the past 12 months, have you or your family members you live with been unable to get utilities (heat, electricity) when it was really needed?: No   Food Insecurity: Low Risk  (2025)    Food Insecurity      Within the past 12 months, did you worry that your food would run out before you got money to buy more?: No      Within the past 12 months, did the food you bought just not last and you didn t have money to get more?: No   Transportation Needs: Low Risk  (2025)    Transportation Needs      Within the past 12 months, has lack of transportation kept you from medical appointments, getting your medicines, non-medical meetings or appointments, work, or from getting things that you need?: No   Physical Activity: Insufficiently Active (2025)     Exercise Vital Sign      Days of Exercise per Week: 1 day      Minutes of Exercise per Session: 10 min   Stress: No Stress Concern Present (2/5/2025)    [place] [employer] - Occupational Stress Questionnaire      Feeling of Stress : Not at all   Social Connections: Unknown (2/5/2025)    Social Connection and Isolation Panel [NHANES]      Frequency of Communication with Friends and Family: Not on file      Frequency of Social Gatherings with Friends and Family: Once a week      Attends Jewish Services: Not on file      Active Member of Clubs or Organizations: Not on file      Attends Club or Organization Meetings: Not on file      Marital Status: Not on file   Interpersonal Safety: Low Risk  (2/10/2025)    Interpersonal Safety      Do you feel physically and emotionally safe where you currently live?: Yes      Within the past 12 months, have you been hit, slapped, kicked or otherwise physically hurt by someone?: No      Within the past 12 months, have you been humiliated or emotionally abused in other ways by your partner or ex-partner?: No   Housing Stability: Low Risk  (2/5/2025)    Housing Stability      Do you have housing? : Yes      Are you worried about losing your housing?: No       Family History     Family History   Problem Relation Age of Onset     Diabetes Mother      Thyroid Disease Mother         goiter      Coronary Artery Disease Mother 68     Hypertension Father      Thyroid Disease Maternal Grandmother         Goiter      Thyroid Disease Daughter         thyrpoi CA, Dx 2/2014     Other Cancer Daughter      Thyroid Disease Daughter      Thyroid Disease Maternal Aunt         Thyroid CA, 2013 - 2014     Other Cancer Maternal Aunt      Other Cancer Cousin      Thyroid Disease Cousin         thyroid CA,      Other Cancer Other      Hyperlipidemia No family hx of      Breast Cancer No family hx of      Cancer - colorectal No family hx of      Ovarian Cancer No family hx of       "Prostate Cancer No family hx of      Cerebrovascular Disease No family hx of      Asthma No family hx of      Known Genetic Syndrome No family hx of      Colon Cancer No family hx of      Anxiety Disorder No family hx of      Substance Abuse No family hx of      Anesthesia Reaction No family hx of        ROS     12 ROS completed, pertinent positive and negative in HPI    Physical Exam   Ht 1.626 m (5' 4\")   Wt 81.6 kg (180 lb)   BMI 30.90 kg/m     GENERAL: alert and no distress  EYES: Eyes grossly normal to inspection.  No discharge or erythema, or obvious scleral/conjunctival abnormalities.  RESP: No audible wheeze, cough, or visible cyanosis.    SKIN: Visible skin clear. No significant rash, abnormal pigmentation or lesions.  NEURO: Cranial nerves grossly intact.  Mentation and speech appropriate for age.  PSYCH: Appropriate affect, tone, and pace of words     Labs/Imaging     Pertinent Labs were reviewed and updated in EPIC and discussed briefly.  Radiology Results were  reviewed and updated in EPIC and discussed briefly.    Summary of recent findings:   No results found for: \"A1C\"    TSH   Date Value Ref Range Status   02/10/2025 0.78 0.30 - 4.20 uIU/mL Final   01/03/2024 1.18 0.30 - 4.20 uIU/mL Final   12/12/2022 0.61 0.40 - 4.00 mU/L Final   07/15/2019 0.90 0.40 - 4.00 mU/L Final   05/13/2016 1.03 0.40 - 4.00 mU/L Final   04/08/2016 0.81 0.40 - 4.00 mU/L Final   02/01/2016 0.49 0.40 - 4.00 mU/L Final   05/22/2015 0.54 0.40 - 4.00 mU/L Final     T4 Free   Date Value Ref Range Status   07/15/2019 0.79 0.76 - 1.46 ng/dL Final   05/13/2016 0.83 0.76 - 1.46 ng/dL Final   04/08/2016 0.84 0.76 - 1.46 ng/dL Final   02/01/2016 0.78 0.76 - 1.46 ng/dL Final   05/22/2015 0.79 0.76 - 1.46 ng/dL Final       Creatinine   Date Value Ref Range Status   02/10/2025 0.62 0.51 - 0.95 mg/dL Final   11/11/2016 0.60 0.52 - 1.04 mg/dL Final       Recent Labs   Lab Test 02/10/25  1045 01/29/24  1032   CHOL 224* 209*   HDL 90 74   LDL " "122* 123*   TRIG 58 59       No results found for: \"[id number]\", \"[id number]\", \"[id number]\"    I personally reviewed the patient's outside records from [facility] EMR and Care Everywhere. Summary of pertinent findings in HPI.    EXAMINATION: US HEAD NECK SOFT TISSUE, 7/29/2019 11:39 AM      COMPARISON: Thyroid ultrasound 5/22/2015     HISTORY: History of left thyroid lobectomy in 2016 for papillary  thyroid carcinoma. Personal history of irritable bowel syndrome.     FINDINGS:  Lymph nodes are measured bilaterally, at Levels 2 to 7, with  measurements as follows:     Right:  Level 2: 2.5 x 1.3 x 1.1 cm, 1.4 x 0.7 x 0.4 cm  Level 3: 2 x 0.9 x 0.5 cm  Level 4: 1.4 x 0.9 x 0.4 cm     Left:  Level 2: 2 x 1 x 0.7 cm, 0.7 x 0.8 x 0.3 cm, 1 x 0.9 x 2.5 cm  Level 3: 1.9 x 1 x 0.5 cm, 0.8 x 0.6 x 0.4 cm  Level 4: 1.8 x 0.8 x 0.5 cm, 1.5 x 0.8 x 0.6 cm  Level 5: 1.2 x 0.6 x 0.2 cm                                                                      IMPRESSION:  1.  The level 2 lymph nodes are prominent and are within normal  limits.  2.  There are prominent lymph nodes on the left side at level 3 and 4,  lateral and deep to the jugular vein. Attention at follow-up is  recommended.       S/p FNA:  CYTOLOGIC INTERPRETATION:     Lymph node, left level 4, ultrasound guided fine needle aspiration:   - Polymorphous lymphoid elements present   - See comment   Specimen Adequacy: Satisfactory for evaluation.     COMMENT:   Concurrent flow cytometry ([id number]) showed polytypic B cells and no   definitive aberrant immunophenotype on T   cells. See separate report for details.   If there is clinical concern for a hematolymphoid process, excisional   biopsy is recommended.       US thyroid 5/22/2015  FINDINGS: The right lobe the thyroid measures 4.6 x 1.6 x 1.7 cm, the left lobe of the thyroid measures 6.0 x 2.4 x 3.2 cm, and the isthmus measures 0.7 cm.       Within the isthmus along the right side is a 0.6 x 0.5 x 0.9 cm hypoechoic nodule " with internal vascularity. .  In the left lobe of the thyroid there is a 3.0 x 1.9 x 5.5 cm heterogeneous nodule with internal vascularity. .     IMPRESSION:  2 thyroid nodules which appear indeterminant. Thyroid ultrasound cannot differentiate malignancy from malignancy.     FNA 5/29/2015  CYTOLOGIC INTERPRETATION:     Thyroid, left nodule, ultrasound guided fine needle aspiration:   -Atypia of undetermined significance   Specimen Adequacy: Satisfactory for evaluation.      FNA 2/2/2016  CYTOLOGIC INTERPRETATION:     Thyroid, left nodule, ultrasound guided fine needle aspiration:   - Atypia of Undetermined Significance      Surgical pathology 3/11/2016  .   Impression / Plan     1. Papillary thyroid cancer s/p left lobectomy  2. Suspicious LN in the past  3. Extensive family hx of thyroid cancer.  She underwent left lobectomy in 2016 for large left thyroid nodule with AUS results on FNA, Fth to have micropapillary thyroid cancer on surgical pathology. In 2020 she had a concerning left level 4 LN s/p FNA with negative TG. We discussed that this microPTC has a very low risk of recurrence but given her extensive family of thyroid cancer would recommend to continue thyroid cancer surveillance with periodic thyroid and neck US. I will also check Tg levels and BA x as a baseline.        Test and/or medications prescribed today:  Orders Placed This Encounter   Procedures     US Thyroid     US Head Neck Soft Tissue     Thyroglobulin and Antibody (Sendout to [facility])         Follow up: 1 year. If results are stable could consider yearly follow-up with PCP.    The longitudinal plan of care for the diagnosis(es)/condition(s) as documented were addressed during this visit. Due to the added complexity in care, I will continue to support Monique in the subsequent management and with ongoing continuity of care.       Melita Johns MD  Endocrinology, Diabetes and Metabolism  [facility]      Again, thank you for allowing  me to participate in the care of your patient.        Sincerely,        Melita Johns MD    Electronically signed

## 2025-03-05 NOTE — PROGRESS NOTES
Endocrinology Clinic Visit 3/5/2025      Video-Visit Details    Type of service:  Video Visit  Joined the call at 3/5/2025, 10:08:18 am.  Left the call at 3/5/2025, 10:22:43 am.    Originating Location (pt. Location): Home        Distant Location (provider location):  Off-site    Mode of Communication:  Video Conference via [employer]    Physician has received verbal consent for a Video Visit from the patient? Yes    I spent a total of 48 minutes on the date of encounter reviewing medical records, evaluating the patient, coordinating care and documenting in the EHR, as detailed above.      NAME:  Cuca Tyler  PCP:  Tomasa Montez  MRN:  [id number]  Reason for Consult:  PTC   Requesting Provider:  Tomasa Montez    Chief Complaint     Chief Complaint   Patient presents with    Thyroid Cancer       History of Present Illness     Cuca Tyler is a 54 year old female who is seen in video visit for left papillary thyroid cancer s/p left lobectomy     Cuca has known hx of left papillary thyroid cancer s/p left lobectomy since 2016. She was found to have left papillary thyroid cancer based on ultrasound in 2015 that showed 5.5 cm left sided heterogeneous nodule and 0.9 cm hypoechoic nodule at the isthmus along the right side. FNA x2 were AUS. 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   She lost follow-up since after her surgery and then established with Dr. Davey in 2019. SHE HAD A NECK US on 2/2020 which showed left level 4 LN of 1.6 cm with reduced hilum. S/p FNA with negative TG. Since then she lost follow-up, no NECK US done.  Her TSH has been at goal between 0.5-2, last checked 2/10/25 was 0.78.  she was never on lt4.  Today she reported feeling without any concerns or complaints. She denied any antorior neck symptoms, no lumps or bumps.       She has no  prior hx of radiation exposure. Other medical problems significant for back pain, IBS and S/P hysterectomy.    She has a family hx of thyroid cancer: daughter had thyroid cancer at age 21, PTC. Aunt with PTC and also thinks great-grandmother had thyroid cancer.    Social: she is a . She does not smoke.      Problem List     Patient Active Problem List   Diagnosis    Family history of thyroid cancer    Family history of thyroid disease    CARDIOVASCULAR SCREENING; LDL GOAL LESS THAN 160    Irritable bowel syndrome with diarrhea    DDD (degenerative disc disease), lumbar    Spondylolisthesis, lumbar region    Pap smear of cervix not needed    H/O hysterectomy for benign disease    Papillary thyroid carcinoma (H)    Hepatic steatosis        Medications     Current Outpatient Medications   Medication Sig Dispense Refill    acetaminophen (TYLENOL) 500 MG tablet Take 500-1,000 mg by mouth every 6 hours as needed for mild pain      cyclobenzaprine (FLEXERIL) 10 MG tablet Take 1 tablet (10 mg) by mouth 3 times daily as needed for muscle spasms. 30 tablet 0    dicyclomine (BENTYL) 10 MG capsule Take 1 capsule (10 mg) by mouth 4 times daily as needed (for IBS symptoms). 30 capsule 1    econazole nitrate 1 % external cream Apply topically daily To affected toenails. 85 g 5    gabapentin (NEURONTIN) 300 MG capsule Take 1 capsule (300 mg) by mouth at bedtime. 90 capsule 1    ibuprofen (ADVIL/MOTRIN) 200 MG tablet Take 200 mg by mouth every 4 hours as needed for pain       No current facility-administered medications for this visit.     Facility-Administered Medications Ordered in Other Visits   Medication Dose Route Frequency Provider Last Rate Last Admin    lidocaine 1 % injection 9 mL  9 mL Subcutaneous Once Tara Sanchez APRN CNP        sodium bicarbonate 8.4 % injection 1 mEq  1 mEq Other Once Tara Sanchez APRN CNP            Allergies     No Known Allergies    Medical / Surgical History      Past Medical History:   Diagnosis Date    Cancer (H)     Thyroid, my aunt & daughter    Congestive heart failure (H)     Mom    DDD (degenerative disc disease), lumbar 10/31/2016    Diabetes (H)     Mom    H/O hysterectomy for benign disease     Motion sickness     Pap smear of cervix not needed     PONV (postoperative nausea and vomiting)     Thyroid nodule 2015     Past Surgical History:   Procedure Laterality Date    BIOPSY  5/15 &     Both on thyroid    COLONOSCOPY N/A 2023    Procedure: COLONOSCOPY, WITH POLYPECTOMY AND BIOPSY;  Surgeon: Griselda Yepez MD;  Location: [facility] OR    COLONOSCOPY WITH CO2 INSUFFLATION N/A 2023    Procedure: COLONOSCOPY, WITH CO2 INSUFFLATION;  Surgeon: Griselda Yepez MD;  Location: [facility] OR    FUSION LUMBAR ANTERIOR ONE LEVEL N/A 2016    Procedure: FUSION LUMBAR ANTERIOR ONE LEVEL;  Surgeon: Kit Barrera MD;  Location: [facility] OR    GYN SURGERY  2008    Hysterectomy    HEAD & NECK SURGERY  3/11/16    Partial thyroid lobectomy, left lobe    HYSTERECTOMY  2008    heavy menses, fibroids    HYSTERECTOMY, PAP NO LONGER INDICATED      OPTICAL TRACKING SYSTEM FUSION SPINE POSTERIOR LUMBAR PERCUTANEOUS ONE LEVEL N/A 2016    Procedure: OPTICAL TRACKING SYSTEM FUSION SPINE POSTERIOR LUMBAR PERCUTANEOUS ONE LEVEL;  Surgeon: Kit Barrera MD;  Location: [facility] OR       Social History     Social History     Socioeconomic History    Marital status:      Spouse name: Not on file    Number of children: Not on file    Years of education: Not on file    Highest education level: Not on file   Occupational History    Not on file   Tobacco Use    Smoking status: Former     Current packs/day: 0.00     Average packs/day: 1 pack/day for 10.0 years (10.0 ttl pk-yrs)     Types: Cigarettes     Start date: 5/15/1987     Quit date: 5/15/1997     Years since quittin.8     Passive exposure: Past    Smokeless tobacco: Never   Vaping Use     Vaping status: Never Used   Substance and Sexual Activity    Alcohol use: Yes     Alcohol/week: 3.0 standard drinks of alcohol     Types: 3 Standard drinks or equivalent per week     Comment: A lillte / occasional - 3-4 per week    Drug use: No    Sexual activity: Yes     Partners: Male     Birth control/protection: Female Surgical     Comment:  one partner; hysterectomy   Other Topics Concern    Parent/sibling w/ CABG, MI or angioplasty before 65F 55M? No   Social History Narrative    Not on file     Social Drivers of Health     Financial Resource Strain: Low Risk  (2/5/2025)    Financial Resource Strain     Within the past 12 months, have you or your family members you live with been unable to get utilities (heat, electricity) when it was really needed?: No   Food Insecurity: Low Risk  (2/5/2025)    Food Insecurity     Within the past 12 months, did you worry that your food would run out before you got money to buy more?: No     Within the past 12 months, did the food you bought just not last and you didn t have money to get more?: No   Transportation Needs: Low Risk  (2/5/2025)    Transportation Needs     Within the past 12 months, has lack of transportation kept you from medical appointments, getting your medicines, non-medical meetings or appointments, work, or from getting things that you need?: No   Physical Activity: Insufficiently Active (2/5/2025)    Exercise Vital Sign     Days of Exercise per Week: 1 day     Minutes of Exercise per Session: 10 min   Stress: No Stress Concern Present (2/5/2025)    [employer] - Occupational Stress Questionnaire     Feeling of Stress : Not at all   Social Connections: Unknown (2/5/2025)    Social Connection and Isolation Panel [NHANES]     Frequency of Communication with Friends and Family: Not on file     Frequency of Social Gatherings with Friends and Family: Once a week     Attends Jewish Services: Not on file     Active Member of  "Clubs or Organizations: Not on file     Attends Club or Organization Meetings: Not on file     Marital Status: Not on file   Interpersonal Safety: Low Risk  (2/10/2025)    Interpersonal Safety     Do you feel physically and emotionally safe where you currently live?: Yes     Within the past 12 months, have you been hit, slapped, kicked or otherwise physically hurt by someone?: No     Within the past 12 months, have you been humiliated or emotionally abused in other ways by your partner or ex-partner?: No   Housing Stability: Low Risk  (2/5/2025)    Housing Stability     Do you have housing? : Yes     Are you worried about losing your housing?: No       Family History     Family History   Problem Relation Age of Onset    Diabetes Mother     Thyroid Disease Mother         goiter     Coronary Artery Disease Mother 68    Hypertension Father     Thyroid Disease Maternal Grandmother         Goiter     Thyroid Disease Daughter         thyrpoi CA, Dx 2/2014    Other Cancer Daughter     Thyroid Disease Daughter     Thyroid Disease Maternal Aunt         Thyroid CA, 2013 - 2014    Other Cancer Maternal Aunt     Other Cancer Cousin     Thyroid Disease Cousin         thyroid CA,     Other Cancer Other     Hyperlipidemia No family hx of     Breast Cancer No family hx of     Cancer - colorectal No family hx of     Ovarian Cancer No family hx of     Prostate Cancer No family hx of     Cerebrovascular Disease No family hx of     Asthma No family hx of     Known Genetic Syndrome No family hx of     Colon Cancer No family hx of     Anxiety Disorder No family hx of     Substance Abuse No family hx of     Anesthesia Reaction No family hx of        ROS     12 ROS completed, pertinent positive and negative in HPI    Physical Exam   Ht 1.626 m (5' 4\")   Wt 81.6 kg (180 lb)   BMI 30.90 kg/m     GENERAL: alert and no distress  EYES: Eyes grossly normal to inspection.  No discharge or erythema, or obvious scleral/conjunctival " "abnormalities.  RESP: No audible wheeze, cough, or visible cyanosis.    SKIN: Visible skin clear. No significant rash, abnormal pigmentation or lesions.  NEURO: Cranial nerves grossly intact.  Mentation and speech appropriate for age.  PSYCH: Appropriate affect, tone, and pace of words     Labs/Imaging     Pertinent Labs were reviewed and updated in EPIC and discussed briefly.  Radiology Results were  reviewed and updated in EPIC and discussed briefly.    Summary of recent findings:   No results found for: \"A1C\"    TSH   Date Value Ref Range Status   02/10/2025 0.78 0.30 - 4.20 uIU/mL Final   01/03/2024 1.18 0.30 - 4.20 uIU/mL Final   12/12/2022 0.61 0.40 - 4.00 mU/L Final   07/15/2019 0.90 0.40 - 4.00 mU/L Final   05/13/2016 1.03 0.40 - 4.00 mU/L Final   04/08/2016 0.81 0.40 - 4.00 mU/L Final   02/01/2016 0.49 0.40 - 4.00 mU/L Final   05/22/2015 0.54 0.40 - 4.00 mU/L Final     T4 Free   Date Value Ref Range Status   07/15/2019 0.79 0.76 - 1.46 ng/dL Final   05/13/2016 0.83 0.76 - 1.46 ng/dL Final   04/08/2016 0.84 0.76 - 1.46 ng/dL Final   02/01/2016 0.78 0.76 - 1.46 ng/dL Final   05/22/2015 0.79 0.76 - 1.46 ng/dL Final       Creatinine   Date Value Ref Range Status   02/10/2025 0.62 0.51 - 0.95 mg/dL Final   11/11/2016 0.60 0.52 - 1.04 mg/dL Final       Recent Labs   Lab Test 02/10/25  1045 01/29/24  1032   CHOL 224* 209*   HDL 90 74   * 123*   TRIG 58 59       No results found for: \"LPGL75QKVCO\", \"[id number]\", \"[id number]\"    I personally reviewed the patient's outside records from [facility] EMR and Care Everywhere. Summary of pertinent findings in HPI.    EXAMINATION: US HEAD NECK SOFT TISSUE, 7/29/2019 11:39 AM      COMPARISON: Thyroid ultrasound 5/22/2015     HISTORY: History of left thyroid lobectomy in 2016 for papillary  thyroid carcinoma. Personal history of irritable bowel syndrome.     FINDINGS:  Lymph nodes are measured bilaterally, at Levels 2 to 7, with  measurements as follows:     Right:  Level 2: " 2.5 x 1.3 x 1.1 cm, 1.4 x 0.7 x 0.4 cm  Level 3: 2 x 0.9 x 0.5 cm  Level 4: 1.4 x 0.9 x 0.4 cm     Left:  Level 2: 2 x 1 x 0.7 cm, 0.7 x 0.8 x 0.3 cm, 1 x 0.9 x 2.5 cm  Level 3: 1.9 x 1 x 0.5 cm, 0.8 x 0.6 x 0.4 cm  Level 4: 1.8 x 0.8 x 0.5 cm, 1.5 x 0.8 x 0.6 cm  Level 5: 1.2 x 0.6 x 0.2 cm                                                                      IMPRESSION:  1.  The level 2 lymph nodes are prominent and are within normal  limits.  2.  There are prominent lymph nodes on the left side at level 3 and 4,  lateral and deep to the jugular vein. Attention at follow-up is  recommended.       S/p FNA:  CYTOLOGIC INTERPRETATION:     Lymph node, left level 4, ultrasound guided fine needle aspiration:   - Polymorphous lymphoid elements present   - See comment   Specimen Adequacy: Satisfactory for evaluation.     COMMENT:   Concurrent flow cytometry ([id number]) showed polytypic B cells and no   definitive aberrant immunophenotype on T   cells. See separate report for details.   If there is clinical concern for a hematolymphoid process, excisional   biopsy is recommended.       US thyroid 5/22/2015  FINDINGS: The right lobe the thyroid measures 4.6 x 1.6 x 1.7 cm, the left lobe of the thyroid measures 6.0 x 2.4 x 3.2 cm, and the isthmus measures 0.7 cm.       Within the isthmus along the right side is a 0.6 x 0.5 x 0.9 cm hypoechoic nodule with internal vascularity. .  In the left lobe of the thyroid there is a 3.0 x 1.9 x 5.5 cm heterogeneous nodule with internal vascularity. .     IMPRESSION:  2 thyroid nodules which appear indeterminant. Thyroid ultrasound cannot differentiate malignancy from malignancy.     FNA 5/29/2015  CYTOLOGIC INTERPRETATION:     Thyroid, left nodule, ultrasound guided fine needle aspiration:   -Atypia of undetermined significance   Specimen Adequacy: Satisfactory for evaluation.      FNA 2/2/2016  CYTOLOGIC INTERPRETATION:     Thyroid, left nodule, ultrasound guided fine needle aspiration:    - Atypia of Undetermined Significance      Surgical pathology 3/11/2016  .   Impression / Plan     1. Papillary thyroid cancer s/p left lobectomy  2. Suspicious LN in the past  3. Extensive family hx of thyroid cancer.  She underwent left lobectomy in 2016 for large left thyroid nodule with AUS results on FNA, Fth to have micropapillary thyroid cancer on surgical pathology. In 2020 she had a concerning left level 4 LN s/p FNA with negative TG. We discussed that this microPTC has a very low risk of recurrence but given her extensive family of thyroid cancer would recommend to continue thyroid cancer surveillance with periodic thyroid and neck US. I will also check Tg levels and BA x as a baseline.        Test and/or medications prescribed today:  Orders Placed This Encounter   Procedures    US Thyroid    US Head Neck Soft Tissue    Thyroglobulin and Antibody (Sendout to [facility])         Follow up: 1 year. If results are stable could consider yearly follow-up with PCP.    The longitudinal plan of care for the diagnosis(es)/condition(s) as documented were addressed during this visit. Due to the added complexity in care, I will continue to support Monique in the subsequent management and with ongoing continuity of care.       Melita Johns MD  Endocrinology, Diabetes and Metabolism  [facility]

## 2025-03-13 ENCOUNTER — ANCILLARY PROCEDURE (OUTPATIENT)
Dept: ULTRASOUND IMAGING | Facility: CLINIC | Age: 55
End: 2025-03-13
Attending: STUDENT IN AN ORGANIZED HEALTH CARE EDUCATION/TRAINING PROGRAM
Payer: COMMERCIAL

## 2025-03-13 ENCOUNTER — LAB (OUTPATIENT)
Dept: LAB | Facility: CLINIC | Age: 55
End: 2025-03-13
Payer: COMMERCIAL

## 2025-03-13 DIAGNOSIS — Z85.850 HX OF PAPILLARY THYROID CARCINOMA: ICD-10-CM

## 2025-03-13 LAB — RADIOLOGIST FLAGS: NORMAL

## 2025-03-13 PROCEDURE — 76536 US EXAM OF HEAD AND NECK: CPT | Performed by: RADIOLOGY

## 2025-04-04 ENCOUNTER — ANCILLARY PROCEDURE (OUTPATIENT)
Dept: MAMMOGRAPHY | Facility: CLINIC | Age: 55
End: 2025-04-04
Attending: PHYSICIAN ASSISTANT
Payer: COMMERCIAL

## 2025-04-04 DIAGNOSIS — Z12.31 ENCOUNTER FOR SCREENING MAMMOGRAM FOR MALIGNANT NEOPLASM OF BREAST: ICD-10-CM

## 2025-04-04 DIAGNOSIS — Z00.00 ROUTINE GENERAL MEDICAL EXAMINATION AT A HEALTH CARE FACILITY: ICD-10-CM

## 2025-04-04 PROCEDURE — 77063 BREAST TOMOSYNTHESIS BI: CPT

## 2025-04-04 PROCEDURE — 77067 SCR MAMMO BI INCL CAD: CPT

## 2025-04-07 ENCOUNTER — PATIENT OUTREACH (OUTPATIENT)
Dept: CARE COORDINATION | Facility: CLINIC | Age: 55
End: 2025-04-07
Payer: COMMERCIAL

## 2025-04-07 ENCOUNTER — ANCILLARY ORDERS (OUTPATIENT)
Dept: MAMMOGRAPHY | Facility: CLINIC | Age: 55
End: 2025-04-07
Payer: COMMERCIAL

## 2025-04-07 DIAGNOSIS — R92.8 ABNORMAL MAMMOGRAM: Primary | ICD-10-CM

## 2025-04-11 ENCOUNTER — HOSPITAL ENCOUNTER (OUTPATIENT)
Dept: ULTRASOUND IMAGING | Facility: CLINIC | Age: 55
Discharge: HOME | End: 2025-04-11
Attending: STUDENT IN AN ORGANIZED HEALTH CARE EDUCATION/TRAINING PROGRAM | Admitting: STUDENT IN AN ORGANIZED HEALTH CARE EDUCATION/TRAINING PROGRAM
Payer: COMMERCIAL

## 2025-04-11 DIAGNOSIS — Z85.850 HX OF PAPILLARY THYROID CARCINOMA: ICD-10-CM

## 2025-04-11 DIAGNOSIS — Z80.8 FAMILY HISTORY OF THYROID CANCER: Primary | ICD-10-CM

## 2025-04-11 DIAGNOSIS — C73 PAPILLARY THYROID CARCINOMA (H): Chronic | ICD-10-CM

## 2025-04-11 DIAGNOSIS — Z83.49 FAMILY HISTORY OF THYROID DISEASE: ICD-10-CM

## 2025-04-11 PROCEDURE — 272N000710 US BIOPSY THYROID FINE NEEDLE ASPIRATION

## 2025-04-11 PROCEDURE — 88173 CYTOPATH EVAL FNA REPORT: CPT | Mod: TC | Performed by: STUDENT IN AN ORGANIZED HEALTH CARE EDUCATION/TRAINING PROGRAM

## 2025-04-15 LAB
PATH REPORT.COMMENTS IMP SPEC: NORMAL
PATH REPORT.COMMENTS IMP SPEC: NORMAL
PATH REPORT.FINAL DX SPEC: NORMAL
PATH REPORT.GROSS SPEC: NORMAL
PATH REPORT.MICROSCOPIC SPEC OTHER STN: NORMAL
PATH REPORT.RELEVANT HX SPEC: NORMAL

## 2025-04-16 ENCOUNTER — ANCILLARY PROCEDURE (OUTPATIENT)
Dept: ULTRASOUND IMAGING | Facility: CLINIC | Age: 55
End: 2025-04-16
Attending: PHYSICIAN ASSISTANT
Payer: COMMERCIAL

## 2025-04-16 ENCOUNTER — ANCILLARY PROCEDURE (OUTPATIENT)
Dept: MAMMOGRAPHY | Facility: CLINIC | Age: 55
End: 2025-04-16
Attending: PHYSICIAN ASSISTANT
Payer: COMMERCIAL

## 2025-04-16 DIAGNOSIS — R92.8 ABNORMAL MAMMOGRAM: ICD-10-CM

## 2025-04-16 PROCEDURE — 76642 ULTRASOUND BREAST LIMITED: CPT | Mod: RT

## 2025-04-16 PROCEDURE — 77065 DX MAMMO INCL CAD UNI: CPT | Mod: RT

## 2025-04-16 PROCEDURE — G0279 TOMOSYNTHESIS, MAMMO: HCPCS

## 2025-04-23 ENCOUNTER — ANCILLARY PROCEDURE (OUTPATIENT)
Dept: MAMMOGRAPHY | Facility: CLINIC | Age: 55
End: 2025-04-23
Attending: PHYSICIAN ASSISTANT
Payer: COMMERCIAL

## 2025-04-23 DIAGNOSIS — R92.8 ABNORMAL MAMMOGRAM: ICD-10-CM

## 2025-04-23 PROCEDURE — 19081 BX BREAST 1ST LESION STRTCTC: CPT | Mod: RT

## 2025-04-23 PROCEDURE — G0279 TOMOSYNTHESIS, MAMMO: HCPCS

## 2025-04-23 PROCEDURE — 77066 DX MAMMO INCL CAD BI: CPT

## 2025-04-23 RX ORDER — IOPAMIDOL 755 MG/ML
98 INJECTION, SOLUTION INTRAVASCULAR ONCE
Status: COMPLETED | OUTPATIENT
Start: 2025-04-23 | End: 2025-04-23

## 2025-04-23 RX ADMIN — IOPAMIDOL 98 ML: 755 INJECTION, SOLUTION INTRAVASCULAR at 10:34

## 2025-04-28 ENCOUNTER — TELEPHONE (OUTPATIENT)
Dept: GENERAL RADIOLOGY | Facility: CLINIC | Age: 55
End: 2025-04-28
Payer: COMMERCIAL

## 2025-04-28 NOTE — TELEPHONE ENCOUNTER
Spoke with patient regarding right breast biopsy results, which indicate radial scar; negative for atypia or malignancy. Notified patient that the radiologist's recommendation is surgical consultation. Patient is set up for consultation with Dr. Montelongo on May14th at [facility]. Patient verbalized understanding of these results and all questions answered to her satisfaction.

## 2025-05-14 ENCOUNTER — OFFICE VISIT (OUTPATIENT)
Dept: SURGERY | Facility: CLINIC | Age: 55
End: 2025-05-14
Payer: COMMERCIAL

## 2025-05-14 VITALS — HEIGHT: 64 IN | BODY MASS INDEX: 32.46 KG/M2 | WEIGHT: 190.1 LBS

## 2025-05-14 DIAGNOSIS — N64.89 RADIAL SCAR OF RIGHT BREAST: Primary | ICD-10-CM

## 2025-05-14 PROCEDURE — 99203 OFFICE O/P NEW LOW 30 MIN: CPT | Performed by: SURGERY

## 2025-05-14 NOTE — NURSING NOTE
"Cuca Tyler's goals for this visit include:   Chief Complaint   Patient presents with    Consult     Radial scar       She requests these members of her care team be copied on today's visit information: Yes    PCP: Tomasa Montez    Referring Provider:  Tomasa Montez PA-C  [id number] [place] [address]    Ht 1.626 m (5' 4\")   Wt 86.2 kg (190 lb 1.6 oz)   BMI 32.63 kg/m      Do you need any medication refills at today's visit? No    Teresa Saavedra LPN    "

## 2025-05-14 NOTE — PROGRESS NOTES
[address] [address]  Phone: [phone]    PATIENT NAME:  Cuca Tyler  PATIENT YOB: 1970    NEW SURGICAL ONCOLOGY CONSULTATION  May 14, 2025    Cuca Tyler is a 55 year old woman who presents with a right  breast complaint.  She was referred by Tomasa Montez PA-C.    HPI:    She notes no masses in either breast, axilla, or neck. She denies any nipple discharge or nipple inversion.     Imaging showed architectural distortion measuring 1 cm at 6:00 mid depth in the RIGHT breast. No contrast enhancement was seen.    A biopsy was performed and a clip was placed.  It showed a radial scar.      BREAST-SPECIFIC HISTORY:  Prior breast surgeries: No  Prior radiation history: No  Bra size: 38 B  Dominant hand: Right    FAMILY HISTORY:  Breast ca: No  Ovarian ca: No  Other cancer: Yes daughter had thyroid caner, great GM, aunt w/ thyroid ca    Patient Active Problem List   Diagnosis    Family history of thyroid cancer    Family history of thyroid disease    CARDIOVASCULAR SCREENING; LDL GOAL LESS THAN 160    Irritable bowel syndrome with diarrhea    DDD (degenerative disc disease), lumbar    Spondylolisthesis, lumbar region    Pap smear of cervix not needed    H/O hysterectomy for benign disease    Papillary thyroid carcinoma (H)    Hepatic steatosis    Ovaries intact  No DM, HTN, MI, CVA    Past Medical History:   Diagnosis Date    Cancer (H)     Thyroid, my aunt & daughter    Congestive heart failure (H)     Mom    DDD (degenerative disc disease), lumbar 10/31/2016    Diabetes (H)     Mom    H/O hysterectomy for benign disease     Motion sickness     Pap smear of cervix not needed     PONV (postoperative nausea and vomiting)     Thyroid nodule 05/26/2015       Past Surgical History:   Procedure Laterality Date    BIOPSY  5/15 & 2/16    Both on thyroid    COLONOSCOPY N/A 2/13/2023    Procedure: COLONOSCOPY, WITH POLYPECTOMY AND  "BIOPSY;  Surgeon: Griselda Yepez MD;  Location: [facility]    COLONOSCOPY WITH CO2 INSUFFLATION N/A 2/13/2023    Procedure: COLONOSCOPY, WITH CO2 INSUFFLATION;  Surgeon: Griselda Yepez MD;  Location: [facility]    FUSION LUMBAR ANTERIOR ONE LEVEL N/A 11/9/2016    Procedure: FUSION LUMBAR ANTERIOR ONE LEVEL;  Surgeon: Kit Barrera MD;  Location: [facility]    GYN SURGERY  8/2008    Hysterectomy    HEAD & NECK SURGERY  3/11/16    Partial thyroid lobectomy, left lobe    HYSTERECTOMY  8/2008    heavy menses, fibroids    HYSTERECTOMY, PAP NO LONGER INDICATED  2008    OPTICAL TRACKING SYSTEM FUSION SPINE POSTERIOR LUMBAR PERCUTANEOUS ONE LEVEL N/A 11/9/2016    Procedure: OPTICAL TRACKING SYSTEM FUSION SPINE POSTERIOR LUMBAR PERCUTANEOUS ONE LEVEL;  Surgeon: Kit Barrera MD;  Location: [facility]   No GA issues    Current Outpatient Medications   Medication Sig Dispense Refill    acetaminophen (TYLENOL) 500 MG tablet Take 500-1,000 mg by mouth every 6 hours as needed for mild pain      cyclobenzaprine (FLEXERIL) 10 MG tablet Take 1 tablet (10 mg) by mouth 3 times daily as needed for muscle spasms. 30 tablet 0    dicyclomine (BENTYL) 10 MG capsule Take 1 capsule (10 mg) by mouth 4 times daily as needed (for IBS symptoms). 30 capsule 1    econazole nitrate 1 % external cream Apply topically daily To affected toenails. 85 g 5    gabapentin (NEURONTIN) 300 MG capsule Take 1 capsule (300 mg) by mouth at bedtime. 90 capsule 1    ibuprofen (ADVIL/MOTRIN) 200 MG tablet Take 200 mg by mouth every 4 hours as needed for pain           No Known Allergies     SOCIAL HISTORY:  Smokes: No  Occupation:   Shoulder arthritis    ROS:  Easy bruising/bleeding: No  History of DVT/PE: No - no FHx (father had a DVT after COVID vaccination)    Ht 1.626 m (5' 4\")   Wt 86.2 kg (190 lb 1.6 oz)   BMI 32.63 kg/m     Physical Exam  Constitutional:       Appearance: She is well-developed.   Chest:   Breasts:     Breasts are " symmetrical.      Right: No inverted nipple, mass, nipple discharge, skin change or tenderness.      Left: No inverted nipple, mass, nipple discharge, skin change or tenderness.      Comments: Patient was examined in both supine and upright positions.   Lymphadenopathy:      Cervical: No cervical adenopathy.      Right cervical: No superficial, deep or posterior cervical adenopathy.     Left cervical: No superficial, deep or posterior cervical adenopathy.      Upper Body:      Right upper body: No supraclavicular, axillary or pectoral adenopathy.      Left upper body: No supraclavicular, axillary or pectoral adenopathy.      Comments: No lymphedema in bilateral upper extremities.   Skin:     General: Skin is warm and dry.          INVESTIGATIONS:    Contrast-Enhanced Mammogram (4/23/2025) showed:  BREAST DENSITY: The breasts are heterogeneously dense, which may obscure small masses.  Findings: Minimal background enhancement. Subtle architectural distortion at the 6:00 position near the central core on the right is again seen. No abnormal enhancement is seen in either breast.  IMPRESSION: BI-RADS CATEGORY: 4 - Suspicious.    Diagnostic Mammogram & Ultrasound (4/16/2025) showed:  BREAST DENSITY: The breasts are heterogeneously dense, which may obscure small masses.  FINDINGS:    Additional mammographic images were obtained for further evaluation of possible architectural distortion in the right breast at the 6:00 position, middle depth. Additional views confirm the finding measuring up to approximately 1 cm.  Targeted right breast ultrasound was performed demonstrating only vague hypoechogenicity in the right breast at the 6:00 position, 5 cm from the nipple..  IMPRESSION: BI-RADS CATEGORY: 4 - Suspicious Abnormality-Biopsy Should Be Considered    Screening Mammogram (4/4/2025) showed:  Findings: The breasts are heterogeneously dense, which may obscure small masses.  There is a possible architectural distortion in the  right breast at the 6 o'clock position, middle depth.  The remainder of the breast tissue is unremarkable.  There is no suspicious finding of the left breast.  IMPRESSION: BI-RADS CATEGORY: 0 - Incomplete - Need additional imaging evaluation.    Biopsy (4/23/2025) showed:  Final Diagnosis   RIGHT breast, 6:00, stereotactic core biopsy:  -Radial scar  -Calcifications associated with benign ducts and acini  -Negative for atypia or malignancy     ASSESSMENT:  Cuca Tyler is a 55 year old woman with a right breast radial scar.    I personally reviewed the imaging above.    I reviewed the imaging and diagnosis with Cuca Tyler.  The natural history of radial scars were discussed with the patient.  This is a proliferative benign lesion of the breast.  An excision following the core needle biopsy is recommended for diagnostic purposes because there are a small percentage of radial scars that are upgraded to non-invasive or invasive cancer following excision.  Because the lesion is not palpable, a radiofrequency identification (RFID) seed-localized approach will be taken.  She would present prior to surgery for an image-guided RFID seed placement, followed by a surgical excision in the operating room.  The risks of a RFID seed-localized lumpectomy were discussed with the patient, including the risks of bleeding, wound infection, wound dehiscence, and post-operative contour change to the breast. We discussed obtaining a supportive bra for postoperative recovery and that prescriptions for narcotics are not typically provided for this procedure. We discussed that surgical pathology results will be reviewed at the postoperative visit to allow for careful discussion of next steps and for answering questions.    Alternatively, watchful waiting with repeat diagnostic imaging in 6 months to determine interval radiographic changes would be reasonable as well.  If there is an interval change, we discussed that additional  biopsies may be recommended prior to surgery depending on findings.    All of the above was discussed with Cuca SERRATO Jayson and all questions were answered.  She will consider her options of how to proceed and will call my office with a decision.    Total time spent with the patient was 30 minutes, of which 75% was counseling.     PLAN:  RIGHT RFID seed-localized lumpectomy vs RIGHT diagnostic mammogram and breast US in 6 months  Patient to report to her PCP for preoperative H&P and testing if surgery  Patient will call with decision on how to proceed.    Bertha Montelongo MD MS [facility] FACS  Associate Professor of Surgery  Division of Surgical Oncology  [facility]     35 minutes spent on the date of the encounter doing chart review, review of test result(s), interpretation of test(s), patient visit, documentation, and care coordination.

## 2025-05-14 NOTE — LETTER
5/14/2025      Cuca Tyler  [address] [place] [address]      Dear Colleague,    Thank you for referring your patient, Cuca Tyler, to the [facility]. Please see a copy of my visit note below.      [address] [address]  Phone: [phone]    PATIENT NAME:  Cuca Tyler  PATIENT YOB: 1970    NEW SURGICAL ONCOLOGY CONSULTATION  May 14, 2025    Cuca Tyler is a 55 year old woman who presents with a right  breast complaint.  She was referred by Tomasa Montez PA-C.    HPI:    She notes no masses in either breast, axilla, or neck. She denies any nipple discharge or nipple inversion.     Imaging showed architectural distortion measuring 1 cm at 6:00 mid depth in the RIGHT breast. No contrast enhancement was seen.    A biopsy was performed and a clip was placed.  It showed a radial scar.      BREAST-SPECIFIC HISTORY:  Prior breast surgeries: No  Prior radiation history: No  Bra size: 38 B  Dominant hand: Right    FAMILY HISTORY:  Breast ca: No  Ovarian ca: No  Other cancer: Yes daughter had thyroid caner, great GM, aunt w/ thyroid ca    Patient Active Problem List   Diagnosis     Family history of thyroid cancer     Family history of thyroid disease     CARDIOVASCULAR SCREENING; LDL GOAL LESS THAN 160     Irritable bowel syndrome with diarrhea     DDD (degenerative disc disease), lumbar     Spondylolisthesis, lumbar region     Pap smear of cervix not needed     H/O hysterectomy for benign disease     Papillary thyroid carcinoma (H)     Hepatic steatosis    Ovaries intact  No DM, HTN, MI, CVA    Past Medical History:   Diagnosis Date     Cancer (H)     Thyroid, my aunt & daughter     Congestive heart failure (H)     Mom     DDD (degenerative disc disease), lumbar 10/31/2016     Diabetes (H)     Mom     H/O hysterectomy for benign disease      Motion sickness      Pap smear of cervix  not needed      PONV (postoperative nausea and vomiting)      Thyroid nodule 05/26/2015       Past Surgical History:   Procedure Laterality Date     BIOPSY  5/15 & 2/16    Both on thyroid     COLONOSCOPY N/A 2/13/2023    Procedure: COLONOSCOPY, WITH POLYPECTOMY AND BIOPSY;  Surgeon: Griselda Yepez MD;  Location: [facility]     COLONOSCOPY WITH CO2 INSUFFLATION N/A 2/13/2023    Procedure: COLONOSCOPY, WITH CO2 INSUFFLATION;  Surgeon: Griselda Yeepz MD;  Location: [facility]     FUSION LUMBAR ANTERIOR ONE LEVEL N/A 11/9/2016    Procedure: FUSION LUMBAR ANTERIOR ONE LEVEL;  Surgeon: Kit Barrera MD;  [facility]: [facility]     GYN SURGERY  8/2008    Hysterectomy     HEAD & NECK SURGERY  3/11/16    Partial thyroid lobectomy, left lobe     HYSTERECTOMY  8/2008    heavy menses, fibroids     HYSTERECTOMY, PAP NO LONGER INDICATED  2008     OPTICAL TRACKING SYSTEM FUSION SPINE POSTERIOR LUMBAR PERCUTANEOUS ONE LEVEL N/A 11/9/2016    Procedure: OPTICAL TRACKING SYSTEM FUSION SPINE POSTERIOR LUMBAR PERCUTANEOUS ONE LEVEL;  Surgeon: Kit Barrera MD;  Location: [facility]   No GA issues    Current Outpatient Medications   Medication Sig Dispense Refill     acetaminophen (TYLENOL) 500 MG tablet Take 500-1,000 mg by mouth every 6 hours as needed for mild pain       cyclobenzaprine (FLEXERIL) 10 MG tablet Take 1 tablet (10 mg) by mouth 3 times daily as needed for muscle spasms. 30 tablet 0     dicyclomine (BENTYL) 10 MG capsule Take 1 capsule (10 mg) by mouth 4 times daily as needed (for IBS symptoms). 30 capsule 1     econazole nitrate 1 % external cream Apply topically daily To affected toenails. 85 g 5     gabapentin (NEURONTIN) 300 MG capsule Take 1 capsule (300 mg) by mouth at bedtime. 90 capsule 1     ibuprofen (ADVIL/MOTRIN) 200 MG tablet Take 200 mg by mouth every 4 hours as needed for pain           No Known Allergies     SOCIAL HISTORY:  Smokes: No  Occupation:   Shoulder arthritis    ROS:  Easy  "bruising/bleeding: No  History of DVT/PE: No - no FHx (father had a DVT after COVID vaccination)    Ht 1.626 m (5' 4\")   Wt 86.2 kg (190 lb 1.6 oz)   BMI 32.63 kg/m     Physical Exam  Constitutional:       Appearance: She is well-developed.   Chest:   Breasts:     Breasts are symmetrical.      Right: No inverted nipple, mass, nipple discharge, skin change or tenderness.      Left: No inverted nipple, mass, nipple discharge, skin change or tenderness.      Comments: Patient was examined in both supine and upright positions.   Lymphadenopathy:      Cervical: No cervical adenopathy.      Right cervical: No superficial, deep or posterior cervical adenopathy.     Left cervical: No superficial, deep or posterior cervical adenopathy.      Upper Body:      Right upper body: No supraclavicular, axillary or pectoral adenopathy.      Left upper body: No supraclavicular, axillary or pectoral adenopathy.      Comments: No lymphedema in bilateral upper extremities.   Skin:     General: Skin is warm and dry.          INVESTIGATIONS:    Contrast-Enhanced Mammogram (4/23/2025) showed:  BREAST DENSITY: The breasts are heterogeneously dense, which may obscure small masses.  Findings: Minimal background enhancement. Subtle architectural distortion at the 6:00 position near the central core on the right is again seen. No abnormal enhancement is seen in either breast.  IMPRESSION: BI-RADS CATEGORY: 4 - Suspicious.    Diagnostic Mammogram & Ultrasound (4/16/2025) showed:  BREAST DENSITY: The breasts are heterogeneously dense, which may obscure small masses.  FINDINGS:    Additional mammographic images were obtained for further evaluation of possible architectural distortion in the right breast at the 6:00 position, middle depth. Additional views confirm the finding measuring up to approximately 1 cm.  Targeted right breast ultrasound was performed demonstrating only vague hypoechogenicity in the right breast at the 6:00 position, 5 cm " from the nipple..  IMPRESSION: BI-RADS CATEGORY: 4 - Suspicious Abnormality-Biopsy Should Be Considered    Screening Mammogram (4/4/2025) showed:  Findings: The breasts are heterogeneously dense, which may obscure small masses.  There is a possible architectural distortion in the right breast at the 6 o'clock position, middle depth.  The remainder of the breast tissue is unremarkable.  There is no suspicious finding of the left breast.  IMPRESSION: BI-RADS CATEGORY: 0 - Incomplete - Need additional imaging evaluation.    Biopsy (4/23/2025) showed:  Final Diagnosis   RIGHT breast, 6:00, stereotactic core biopsy:  -Radial scar  -Calcifications associated with benign ducts and acini  -Negative for atypia or malignancy     ASSESSMENT:  Cuca Tyler is a 55 year old woman with a right breast radial scar.    I personally reviewed the imaging above.    I reviewed the imaging and diagnosis with Cuca Tyler.  The natural history of radial scars were discussed with the patient.  This is a proliferative benign lesion of the breast.  An excision following the core needle biopsy is recommended for diagnostic purposes because there are a small percentage of radial scars that are upgraded to non-invasive or invasive cancer following excision.  Because the lesion is not palpable, a radiofrequency identification (RFID) seed-localized approach will be taken.  She would present prior to surgery for an image-guided RFID seed placement, followed by a surgical excision in the operating room.  The risks of a RFID seed-localized lumpectomy were discussed with the patient, including the risks of bleeding, wound infection, wound dehiscence, and post-operative contour change to the breast. We discussed obtaining a supportive bra for postoperative recovery and that prescriptions for narcotics are not typically provided for this procedure. We discussed that surgical pathology results will be reviewed at the postoperative visit to allow  for careful discussion of next steps and for answering questions.    Alternatively, watchful waiting with repeat diagnostic imaging in 6 months to determine interval radiographic changes would be reasonable as well.  If there is an interval change, we discussed that additional biopsies may be recommended prior to surgery depending on findings.    All of the above was discussed with Cuca SERRATO Dustinnavdeep and all questions were answered.  She will consider her options of how to proceed and will call my office with a decision.    Total time spent with the patient was 30 minutes, of which 75% was counseling.     PLAN:  RIGHT RFID seed-localized lumpectomy vs RIGHT diagnostic mammogram and breast US in 6 months  Patient to report to her PCP for preoperative H&P and testing if surgery  Patient will call with decision on how to proceed.    Bertha Montelongo MD MS [facility] FACS  Associate Professor of Surgery  Division of Surgical Oncology  [facility]     35 minutes spent on the date of the encounter doing chart review, review of test result(s), interpretation of test(s), patient visit, documentation, and care coordination.    Again, thank you for allowing me to participate in the care of your patient.        Sincerely,        Bertha Montelongo MD    Electronically signed

## 2025-05-14 NOTE — NURSING NOTE
Contact information given to pt and advised pt to call RN back or send a mychart with her decision on Surgery vs Imaging...Anahy Pena RN

## 2025-06-02 ENCOUNTER — TRANSFERRED RECORDS (OUTPATIENT)
Dept: HEALTH INFORMATION MANAGEMENT | Facility: CLINIC | Age: 55
End: 2025-06-02
Payer: COMMERCIAL

## 2025-07-15 ENCOUNTER — TRANSFERRED RECORDS (OUTPATIENT)
Dept: HEALTH INFORMATION MANAGEMENT | Facility: CLINIC | Age: 55
End: 2025-07-15
Payer: COMMERCIAL

## 2025-07-25 ENCOUNTER — HOSPITAL ENCOUNTER (OUTPATIENT)
Dept: CT IMAGING | Facility: CLINIC | Age: 55
End: 2025-07-25
Attending: PHYSICIAN ASSISTANT
Payer: COMMERCIAL

## 2025-07-25 DIAGNOSIS — R91.1 PULMONARY NODULE, RIGHT: ICD-10-CM

## 2025-07-25 PROCEDURE — 71250 CT THORAX DX C-: CPT

## (undated) DEVICE — KIT ENDO FIRST STEP DISINFECTANT 200ML W/POUCH EP-4

## (undated) DEVICE — PREP CHLORAPREP 26ML TINTED ORANGE  260815

## (undated) DEVICE — PAD CHUX UNDERPAD 23X24" 7136

## (undated) RX ORDER — FENTANYL CITRATE 50 UG/ML
INJECTION, SOLUTION INTRAMUSCULAR; INTRAVENOUS
Status: DISPENSED
Start: 2023-02-13

## (undated) RX ORDER — LIDOCAINE HYDROCHLORIDE 10 MG/ML
INJECTION, SOLUTION EPIDURAL; INFILTRATION; INTRACAUDAL; PERINEURAL
Status: DISPENSED
Start: 2020-02-21